# Patient Record
Sex: FEMALE | Race: WHITE | NOT HISPANIC OR LATINO | Employment: OTHER | URBAN - METROPOLITAN AREA
[De-identification: names, ages, dates, MRNs, and addresses within clinical notes are randomized per-mention and may not be internally consistent; named-entity substitution may affect disease eponyms.]

---

## 2018-06-11 ENCOUNTER — OFFICE VISIT (OUTPATIENT)
Dept: OPHTHALMOLOGY | Facility: CLINIC | Age: 83
End: 2018-06-11
Payer: MEDICARE

## 2018-06-11 DIAGNOSIS — R73.03 PRE-DIABETES: ICD-10-CM

## 2018-06-11 DIAGNOSIS — H52.4 BILATERAL PRESBYOPIA: ICD-10-CM

## 2018-06-11 DIAGNOSIS — Z96.1 PSEUDOPHAKIA OF BOTH EYES: Primary | ICD-10-CM

## 2018-06-11 DIAGNOSIS — I10 ESSENTIAL HYPERTENSION: ICD-10-CM

## 2018-06-11 PROCEDURE — 99999 PR PBB SHADOW E&M-EST. PATIENT-LVL II: CPT | Mod: PBBFAC,,, | Performed by: OPTOMETRIST

## 2018-06-11 PROCEDURE — 92015 DETERMINE REFRACTIVE STATE: CPT | Mod: S$GLB,,, | Performed by: OPTOMETRIST

## 2018-06-11 PROCEDURE — 92014 COMPRE OPH EXAM EST PT 1/>: CPT | Mod: S$GLB,,, | Performed by: OPTOMETRIST

## 2018-06-11 NOTE — PROGRESS NOTES
HPI     Blurred vision after reading for a long period of time.  Last eye exam 05/03/2016 TRF.   Update glasses RX.    Last edited by Luisa Das on 6/11/2018  2:26 PM. (History)            Assessment /Plan     For exam results, see Encounter Report.    Pseudophakia of both eyes    Essential hypertension    Pre-diabetes    Bilateral presbyopia      No HTN Retinopathy    Stable IOL OU.    No Background Diabetic Retinopathy    Dispense Final Rx for glasses.  RTC 1 year  Discussed above and answered questions.

## 2018-06-11 NOTE — PATIENT INSTRUCTIONS
Pseudophakia of both eyes     Essential hypertension     Pre-diabetes     Bilateral presbyopia        No HTN Retinopathy     Stable IOL OU.     No Background Diabetic Retinopathy     Dispense Final Rx for glasses.  RTC 1 year  Discussed above and answered questions.

## 2019-07-18 ENCOUNTER — OFFICE VISIT (OUTPATIENT)
Dept: OPHTHALMOLOGY | Facility: CLINIC | Age: 84
End: 2019-07-18
Payer: COMMERCIAL

## 2019-07-18 DIAGNOSIS — I10 ESSENTIAL HYPERTENSION: ICD-10-CM

## 2019-07-18 DIAGNOSIS — Z96.1 PSEUDOPHAKIA OF BOTH EYES: ICD-10-CM

## 2019-07-18 DIAGNOSIS — H52.4 BILATERAL PRESBYOPIA: ICD-10-CM

## 2019-07-18 DIAGNOSIS — R73.03 PRE-DIABETES: Primary | ICD-10-CM

## 2019-07-18 PROCEDURE — 99999 PR PBB SHADOW E&M-EST. PATIENT-LVL I: ICD-10-PCS | Mod: PBBFAC,,, | Performed by: OPTOMETRIST

## 2019-07-18 PROCEDURE — 92015 PR REFRACTION: ICD-10-PCS | Mod: S$GLB,,, | Performed by: OPTOMETRIST

## 2019-07-18 PROCEDURE — 92015 DETERMINE REFRACTIVE STATE: CPT | Mod: S$GLB,,, | Performed by: OPTOMETRIST

## 2019-07-18 PROCEDURE — 92014 PR EYE EXAM, EST PATIENT,COMPREHESV: ICD-10-PCS | Mod: S$GLB,,, | Performed by: OPTOMETRIST

## 2019-07-18 PROCEDURE — 92014 COMPRE OPH EXAM EST PT 1/>: CPT | Mod: S$GLB,,, | Performed by: OPTOMETRIST

## 2019-07-18 PROCEDURE — 99999 PR PBB SHADOW E&M-EST. PATIENT-LVL I: CPT | Mod: PBBFAC,,, | Performed by: OPTOMETRIST

## 2019-07-18 NOTE — PROGRESS NOTES
HPI     Hypertensive Eye Exam      Additional comments: Yearly              Comments     Last seen by TRF on 6/11/18 for yearly eye exam  Patient here today for yearly eye exam  No noticeable changes in vision since last eye exam  Wears Bifocal glasses full-time updated 3 years ago  No other complaints  No drops    1. PCIOL OU          Last edited by Jessica Jarvis, PCT on 7/18/2019  3:06 PM. (History)              Assessment /Plan     For exam results, see Encounter Report.    Pre-diabetes    Essential hypertension    Pseudophakia of both eyes    Bilateral presbyopia      No Background Diabetic Retinopathy    No HTN Retinopathy    Stable IOL OU.    Dispense Final Rx for glasses.  RTC 1 year  Discussed above and answered questions.

## 2020-07-27 ENCOUNTER — OFFICE VISIT (OUTPATIENT)
Dept: OPHTHALMOLOGY | Facility: CLINIC | Age: 85
End: 2020-07-27
Payer: COMMERCIAL

## 2020-07-27 DIAGNOSIS — Z96.1 PSEUDOPHAKIA OF BOTH EYES: ICD-10-CM

## 2020-07-27 DIAGNOSIS — I10 ESSENTIAL HYPERTENSION: ICD-10-CM

## 2020-07-27 DIAGNOSIS — R73.03 PRE-DIABETES: Primary | ICD-10-CM

## 2020-07-27 DIAGNOSIS — H52.4 BILATERAL PRESBYOPIA: ICD-10-CM

## 2020-07-27 PROCEDURE — 99999 PR PBB SHADOW E&M-EST. PATIENT-LVL III: ICD-10-PCS | Mod: PBBFAC,,, | Performed by: OPTOMETRIST

## 2020-07-27 PROCEDURE — 92014 PR EYE EXAM, EST PATIENT,COMPREHESV: ICD-10-PCS | Mod: S$GLB,,, | Performed by: OPTOMETRIST

## 2020-07-27 PROCEDURE — 92014 COMPRE OPH EXAM EST PT 1/>: CPT | Mod: S$GLB,,, | Performed by: OPTOMETRIST

## 2020-07-27 PROCEDURE — 92015 PR REFRACTION: ICD-10-PCS | Mod: S$GLB,,, | Performed by: OPTOMETRIST

## 2020-07-27 PROCEDURE — 99999 PR PBB SHADOW E&M-EST. PATIENT-LVL III: CPT | Mod: PBBFAC,,, | Performed by: OPTOMETRIST

## 2020-07-27 PROCEDURE — 92015 DETERMINE REFRACTIVE STATE: CPT | Mod: S$GLB,,, | Performed by: OPTOMETRIST

## 2020-07-27 RX ORDER — IBUPROFEN 100 MG/5ML
1000 SUSPENSION, ORAL (FINAL DOSE FORM) ORAL
COMMUNITY

## 2020-07-27 RX ORDER — ALBUTEROL SULFATE 90 UG/1
2 AEROSOL, METERED RESPIRATORY (INHALATION) EVERY 4 HOURS PRN
COMMUNITY
Start: 2020-07-13 | End: 2020-08-12

## 2020-07-27 RX ORDER — ACETAMINOPHEN 160 MG/5ML
200 SUSPENSION, ORAL (FINAL DOSE FORM) ORAL
COMMUNITY

## 2020-07-27 RX ORDER — METOPROLOL TARTRATE 25 MG/1
TABLET, FILM COATED ORAL
COMMUNITY
Start: 2017-01-30

## 2020-07-27 NOTE — PROGRESS NOTES
HPI     Eye Exam      Additional comments: yearly              Comments     Pre-diabetic  Last Exam w/ TRF 7/18/2019  Pt states that her eyes have been burning and her eyelids itch  OS feels like its straining to see especially up close.  No Pain          Last edited by Amita Spear on 7/27/2020  3:04 PM. (History)            Assessment /Plan     For exam results, see Encounter Report.    Pre-diabetes    Essential hypertension    Pseudophakia of both eyes    Bilateral presbyopia      No Background Diabetic Retinopathy    No HTN Retinopathy    Stable IOL OU.    Dispense Final Rx for glasses.  RTC 1 year  Discussed above and answered questions.

## 2021-05-12 ENCOUNTER — OFFICE VISIT (OUTPATIENT)
Dept: OPHTHALMOLOGY | Facility: CLINIC | Age: 86
End: 2021-05-12
Payer: MEDICARE

## 2021-05-12 DIAGNOSIS — M35.01 KERATITIS SICCA, BILATERAL: Primary | ICD-10-CM

## 2021-05-12 PROCEDURE — 99999 PR PBB SHADOW E&M-EST. PATIENT-LVL III: ICD-10-PCS | Mod: PBBFAC,,, | Performed by: OPTOMETRIST

## 2021-05-12 PROCEDURE — 92012 INTRM OPH EXAM EST PATIENT: CPT | Mod: S$GLB,,, | Performed by: OPTOMETRIST

## 2021-05-12 PROCEDURE — 92012 PR EYE EXAM, EST PATIENT,INTERMED: ICD-10-PCS | Mod: S$GLB,,, | Performed by: OPTOMETRIST

## 2021-05-12 PROCEDURE — 99999 PR PBB SHADOW E&M-EST. PATIENT-LVL III: CPT | Mod: PBBFAC,,, | Performed by: OPTOMETRIST

## 2021-05-12 RX ORDER — CYCLOSPORINE 0.5 MG/ML
1 EMULSION OPHTHALMIC 2 TIMES DAILY
Qty: 60 VIAL | Refills: 6 | Status: SHIPPED | OUTPATIENT
Start: 2021-05-12 | End: 2022-10-17

## 2021-05-14 ENCOUNTER — TELEPHONE (OUTPATIENT)
Dept: OPHTHALMOLOGY | Facility: CLINIC | Age: 86
End: 2021-05-14

## 2021-05-17 ENCOUNTER — TELEPHONE (OUTPATIENT)
Dept: OPHTHALMOLOGY | Facility: CLINIC | Age: 86
End: 2021-05-17

## 2021-05-17 DIAGNOSIS — M35.01 KERATITIS SICCA, BILATERAL: Primary | ICD-10-CM

## 2021-05-17 RX ORDER — PREDNISOLONE ACETATE 10 MG/ML
1 SUSPENSION/ DROPS OPHTHALMIC 2 TIMES DAILY
Qty: 5 ML | Refills: 0 | Status: SHIPPED | OUTPATIENT
Start: 2021-05-17 | End: 2021-05-27

## 2021-07-27 ENCOUNTER — OFFICE VISIT (OUTPATIENT)
Dept: OPHTHALMOLOGY | Facility: CLINIC | Age: 86
End: 2021-07-27
Payer: MEDICARE

## 2021-07-27 DIAGNOSIS — R73.03 PRE-DIABETES: ICD-10-CM

## 2021-07-27 DIAGNOSIS — H52.4 BILATERAL PRESBYOPIA: ICD-10-CM

## 2021-07-27 DIAGNOSIS — I10 ESSENTIAL HYPERTENSION: ICD-10-CM

## 2021-07-27 DIAGNOSIS — Z96.1 PSEUDOPHAKIA OF BOTH EYES: ICD-10-CM

## 2021-07-27 DIAGNOSIS — M35.01 KERATITIS SICCA, BILATERAL: Primary | ICD-10-CM

## 2021-07-27 PROCEDURE — 92015 PR REFRACTION: ICD-10-PCS | Mod: S$GLB,,, | Performed by: OPTOMETRIST

## 2021-07-27 PROCEDURE — 99999 PR PBB SHADOW E&M-EST. PATIENT-LVL III: CPT | Mod: PBBFAC,,, | Performed by: OPTOMETRIST

## 2021-07-27 PROCEDURE — 92015 DETERMINE REFRACTIVE STATE: CPT | Mod: S$GLB,,, | Performed by: OPTOMETRIST

## 2021-07-27 PROCEDURE — 92014 COMPRE OPH EXAM EST PT 1/>: CPT | Mod: S$GLB,,, | Performed by: OPTOMETRIST

## 2021-07-27 PROCEDURE — 1159F MED LIST DOCD IN RCRD: CPT | Mod: CPTII,S$GLB,, | Performed by: OPTOMETRIST

## 2021-07-27 PROCEDURE — 99999 PR PBB SHADOW E&M-EST. PATIENT-LVL III: ICD-10-PCS | Mod: PBBFAC,,, | Performed by: OPTOMETRIST

## 2021-07-27 PROCEDURE — 92014 PR EYE EXAM, EST PATIENT,COMPREHESV: ICD-10-PCS | Mod: S$GLB,,, | Performed by: OPTOMETRIST

## 2021-07-27 PROCEDURE — 1159F PR MEDICATION LIST DOCUMENTED IN MEDICAL RECORD: ICD-10-PCS | Mod: CPTII,S$GLB,, | Performed by: OPTOMETRIST

## 2022-10-14 ENCOUNTER — TELEPHONE (OUTPATIENT)
Dept: OPHTHALMOLOGY | Facility: CLINIC | Age: 87
End: 2022-10-14
Payer: MEDICARE

## 2022-10-14 NOTE — TELEPHONE ENCOUNTER
----- Message from Blake Chase MA sent at 10/14/2022  9:56 AM CDT -----  Contact: [phone]  Patient called            Patient would like for staff to give a call back in regards to getting patient scheduled for eye exam.          Call back  [phone]

## 2022-10-17 ENCOUNTER — OFFICE VISIT (OUTPATIENT)
Dept: OPHTHALMOLOGY | Facility: CLINIC | Age: 87
End: 2022-10-17
Payer: MEDICARE

## 2022-10-17 DIAGNOSIS — M35.01 KERATITIS SICCA, BILATERAL: ICD-10-CM

## 2022-10-17 DIAGNOSIS — I10 ESSENTIAL HYPERTENSION: ICD-10-CM

## 2022-10-17 DIAGNOSIS — R73.03 PRE-DIABETES: Primary | ICD-10-CM

## 2022-10-17 DIAGNOSIS — Z96.1 PSEUDOPHAKIA OF BOTH EYES: ICD-10-CM

## 2022-10-17 DIAGNOSIS — H52.4 BILATERAL PRESBYOPIA: ICD-10-CM

## 2022-10-17 PROCEDURE — 92014 PR EYE EXAM, EST PATIENT,COMPREHESV: ICD-10-PCS | Mod: S$GLB,,, | Performed by: OPTOMETRIST

## 2022-10-17 PROCEDURE — 92015 DETERMINE REFRACTIVE STATE: CPT | Mod: S$GLB,,, | Performed by: OPTOMETRIST

## 2022-10-17 PROCEDURE — 92015 PR REFRACTION: ICD-10-PCS | Mod: S$GLB,,, | Performed by: OPTOMETRIST

## 2022-10-17 PROCEDURE — 2023F PR DILATED RETINAL EXAM W/O EVID OF RETINOPATHY: ICD-10-PCS | Mod: CPTII,S$GLB,, | Performed by: OPTOMETRIST

## 2022-10-17 PROCEDURE — 2023F DILAT RTA XM W/O RTNOPTHY: CPT | Mod: CPTII,S$GLB,, | Performed by: OPTOMETRIST

## 2022-10-17 PROCEDURE — 1159F MED LIST DOCD IN RCRD: CPT | Mod: CPTII,S$GLB,, | Performed by: OPTOMETRIST

## 2022-10-17 PROCEDURE — 99999 PR PBB SHADOW E&M-EST. PATIENT-LVL III: CPT | Mod: PBBFAC,,, | Performed by: OPTOMETRIST

## 2022-10-17 PROCEDURE — 92014 COMPRE OPH EXAM EST PT 1/>: CPT | Mod: S$GLB,,, | Performed by: OPTOMETRIST

## 2022-10-17 PROCEDURE — 1159F PR MEDICATION LIST DOCUMENTED IN MEDICAL RECORD: ICD-10-PCS | Mod: CPTII,S$GLB,, | Performed by: OPTOMETRIST

## 2022-10-17 PROCEDURE — 99999 PR PBB SHADOW E&M-EST. PATIENT-LVL III: ICD-10-PCS | Mod: PBBFAC,,, | Performed by: OPTOMETRIST

## 2022-10-17 NOTE — PROGRESS NOTES
HPI    Annual exam.  Pt c/o problems reading for lengths of time.  She uses   tears, and vision returns to normal.    Pre-diabetic  Lab Results       Component                Value               Date                       HGBA1C                   5.8                 10/07/2013              Last edited by Constance Dixon MA on 10/17/2022  8:13 AM.            Assessment /Plan     For exam results, see Encounter Report.    Pre-diabetes    Keratitis sicca, bilateral    Essential hypertension    Pseudophakia of both eyes    Bilateral presbyopia    No Background Diabetic Retinopathy    Continue AT prn    No HTN Retinopathy    Stable IOL OU.    Dispense Final Rx for glasses or may use OTC glasses.  RTC 1 year  Discussed above and answered questions.

## 2023-06-07 ENCOUNTER — TELEPHONE (OUTPATIENT)
Dept: OPHTHALMOLOGY | Facility: CLINIC | Age: 88
End: 2023-06-07
Payer: MEDICARE

## 2023-06-07 NOTE — TELEPHONE ENCOUNTER
----- Message from Starr Jimenes sent at 6/7/2023  1:42 PM CDT -----  Contact: Annel Nayak is calling in regards to appt. Reports needing to schedule annual eye exam and office visit. Please return call at .[phone].

## 2023-06-18 DIAGNOSIS — N64.4 BREAST PAIN, LEFT: Primary | ICD-10-CM

## 2023-06-19 NOTE — PROGRESS NOTES
[facility]  MD Nitin Fox, NP-C    Chief Complaint:   Annel Arteaga is a 87 y.o. female presenting today for left breast pain that she first noticed 6 weeks ago.  She started drinking  a cook a day over the last 6 weeks. She is past due for  a Mammogram  She was last seen 2/23/2016.    History of Present Illness:   Mrs. Arteaga first presented on May 21, 2014 with left breast tenderness. Imaging results have been normal. In 2016 her CT chest showed a right breast nodule. Her mammogram and ultrasound remained normal. She did not follow up after  February 23, 2016 until she presents back June 25, 2023 with left breast pain. MD::: Néstor Hill MD.    Past Medical History:   Diagnosis Date    Allergy     Anesthesia     Arthritis     Asthma     Cataract      both eyes CPG    HEARING LOSS     High cholesterol     Hypertension     Joint pain     osteopenia    Mastodynia of left breast 6/25/2023    Obesity     Osteoporosis, unspecified     Pneumonia     did not require hospitalization      Past Surgical History:   Procedure Laterality Date    APPENDECTOMY      CATARACT EXTRACTION Bilateral     CPG    CATARACT EXTRACTION, BILATERAL  2006    HAND SURGERY      HYSTERECTOMY      STAPEDECTOMY      x 2    TONSILLECTOMY          Current Outpatient Medications:     amlodipine (NORVASC) 5 MG tablet, Take 5 mg by mouth once daily. , Disp: , Rfl: 0    ASCORBATE CALCIUM (KELL-C ORAL), Take by mouth.  , Disp: , Rfl:     ascorbic acid, vitamin C, (VITAMIN C) 1000 MG tablet, Take 1,000 mg by mouth., Disp: , Rfl:     aspirin (ECOTRIN) 81 MG EC tablet, Take 81 mg by mouth once daily.  , Disp: , Rfl:     b complex vitamins tablet, Take 1 tablet by mouth once daily., Disp: , Rfl:     biotin 10 mg Tab, Take 1 tablet by mouth once daily., Disp: , Rfl:     coenzyme Q10 200 mg capsule, Take 200 mg by mouth., Disp: , Rfl:     desonide (DESOWEN) 0.05 % cream, Apply topically 2 (two)  times daily. Mix 1:1 and use with ketoconazole cream BID prn for rash, use as needed for rash of face, Disp: 60 g, Rfl: 2    dextromethorphan-guaifenesin  mg (MUCINEX DM)  mg per 12 hr tablet, Take 1 tablet by mouth every 12 (twelve) hours as needed., Disp: , Rfl:     diphenhydrAMINE (SOMINEX) 25 mg tablet, Take 25 mg by mouth nightly as needed.  , Disp: , Rfl:     DOCUSATE CALCIUM (STOOL SOFTENER ORAL), Take by mouth once daily., Disp: , Rfl:     ERGOCALCIFEROL, VITAMIN D2, (VITAMIN D ORAL), Take by mouth once daily., Disp: , Rfl:     fluticasone (FLONASE) 50 mcg/actuation nasal spray, INSTILL 1 SPRAY IN EACH NOSTRIL TWICE DAILY, Disp: 1 Bottle, Rfl: 6    ketoconazole (NIZORAL) 2 % cream, AS DIRECTED TWICE DAILY AS NEEDED, Disp: 60 g, Rfl: 3    Lactobacillus acidophilus 10 billion cell Cap, Take 200 mg by mouth., Disp: , Rfl:     methylcellulose (ARTIFICIAL TEARS) 1 % ophthalmic solution, 1 drop as needed.  , Disp: , Rfl:     metoprolol tartrate (LOPRESSOR) 25 MG tablet, TK 1 T PO D, Disp: , Rfl:     montelukast (SINGULAIR) 10 mg tablet, Take 10 mg by mouth every evening., Disp: , Rfl:     multivitamin (THERAGRAN) per tablet, Take 1 tablet by mouth once daily.  , Disp: , Rfl:     omeprazole (PRILOSEC) 20 MG capsule, TAKE 2 CAPSULES BY MOUTH EVERY DAY, Disp: 180 capsule, Rfl: 0    TURMERIC ORAL, Take 400 mg by mouth., Disp: , Rfl:     vitamin E 400 UNIT capsule, Take 400 Units by mouth once daily.  , Disp: , Rfl:    Review of patient's allergies indicates:   Allergen Reactions    Onion Shortness Of Breath    Zoloft [sertraline] Hives, Itching and Rash    Codeine Other (See Comments)     Flushing skin    Esomeprazole magnesium Other (See Comments)     Leg pain    Nexium [esomeprazole magnesium] Other (See Comments)     Leg pain    Other Other (See Comments)     Pseudocholinesterase -- Paralysis    Pineapple Nausea And Vomiting    Prevacid [lansoprazole] Other (See Comments)     dysuria    Penicillins Rash     Vesicare [solifenacin] Hives and Rash      Social History     Tobacco Use    Smoking status: Never    Smokeless tobacco: Never   Substance Use Topics    Alcohol use: No      Family History   Problem Relation Age of Onset    Hypertension Mother     Glaucoma Father     Hypertension Father     Cancer Father         prostate     Hypertension Sister     Diabetes Sister     Heart disease Sister     Retinal detachment Sister     Glaucoma Brother     Hypertension Brother     Cancer Brother         Prostate    Hypertension Sister     Hypertension Sister     Hypertension Sister     Diabetes Daughter     Melanoma Neg Hx     Kidney disease Neg Hx     Stroke Neg Hx         Review of Systems   Integumentary:  Positive for breast tenderness. Negative for color change, rash, mole/lesion, breast mass and breast discharge.   Breast: Positive for tenderness.Negative for mass     Physical Exam   Constitutional: She appears well-developed. She is cooperative.   HENT:   Head: Normocephalic.   Cardiovascular:  Normal rate and regular rhythm.            Pulmonary/Chest: She exhibits no tenderness and no bony tenderness. Right breast exhibits no mass, no nipple discharge, no skin change and no tenderness. Left breast exhibits no mass, no nipple discharge, no skin change and no tenderness.   Abdominal: Soft. Normal appearance.   Musculoskeletal: Lymphadenopathy:      Upper Body:      Right upper body: No supraclavicular or axillary adenopathy.      Left upper body: No supraclavicular or axillary adenopathy.     Neurological: She is alert.   Skin: No rash noted.      Mammogram:  The breast tissue is heterogeneously dense, which lowers the sensitivity of mammography. Benign-type calcifications are identified, including vascular calcifications.  The mammographic appearance of both breasts has remained stable, and no definite mammographic signs of breast neoplasm are seen.    Ultrasound Left: normal with NEM    NOTE:::We viewed her films  together at today's visit.  We discussed the multiple views obtained and the important findings.  Even benign changes were mentioned and her questions were answered.  She knows that she may receive a formal letter or report from the Radiologist.  She is to contact us if she has questions.      Assessment/Plan  1. Mastodynia of left breast  Assessment & Plan:  We discussed our fibrocystic mastopathy protocol in detail. She knows that if she follows this protocol - that her symptoms should improve.  We discussed how breast pain is usually not associated with breast cancer, however, pain can be the presenting symptom with some cancers (but this could be coincidental). Still, if her pain does not improve in 8-12 weeks she should call us back for additional recommendations.               Medical Decision Making:  It is my impression that this patient suffers all conditions contained in this medical document.  Each of these conditions did affect our plan of care and my medical decision making today.  It is my opinion that the medical decision making concerning this patient was of moderate difficulty based on the aforementioned conditions.  Any further recommendations will be communicated to the patient by me.  I have reviewed and verified her allergies, list of medications, medical and surgical histories, social history, and a pertinent review of symptoms.      Follow up:  6 months and prn    For:  PE

## 2023-06-25 PROBLEM — N64.4 MASTODYNIA OF LEFT BREAST: Status: ACTIVE | Noted: 2023-06-25

## 2023-06-26 ENCOUNTER — OFFICE VISIT (OUTPATIENT)
Dept: SURGERY | Facility: CLINIC | Age: 88
End: 2023-06-26
Payer: MEDICARE

## 2023-06-26 DIAGNOSIS — N64.4 MASTODYNIA OF LEFT BREAST: ICD-10-CM

## 2023-06-26 PROCEDURE — 1159F MED LIST DOCD IN RCRD: CPT | Mod: CPTII,S$GLB,, | Performed by: NURSE PRACTITIONER

## 2023-06-26 PROCEDURE — 1126F AMNT PAIN NOTED NONE PRSNT: CPT | Mod: CPTII,S$GLB,, | Performed by: NURSE PRACTITIONER

## 2023-06-26 PROCEDURE — 1160F RVW MEDS BY RX/DR IN RCRD: CPT | Mod: CPTII,S$GLB,, | Performed by: NURSE PRACTITIONER

## 2023-06-26 PROCEDURE — 99213 PR OFFICE/OUTPT VISIT, EST, LEVL III, 20-29 MIN: ICD-10-PCS | Mod: S$GLB,,, | Performed by: NURSE PRACTITIONER

## 2023-06-26 PROCEDURE — 1159F PR MEDICATION LIST DOCUMENTED IN MEDICAL RECORD: ICD-10-PCS | Mod: CPTII,S$GLB,, | Performed by: NURSE PRACTITIONER

## 2023-06-26 PROCEDURE — 1126F PR PAIN SEVERITY QUANTIFIED, NO PAIN PRESENT: ICD-10-PCS | Mod: CPTII,S$GLB,, | Performed by: NURSE PRACTITIONER

## 2023-06-26 PROCEDURE — 99213 OFFICE O/P EST LOW 20 MIN: CPT | Mod: S$GLB,,, | Performed by: NURSE PRACTITIONER

## 2023-06-26 PROCEDURE — 1160F PR REVIEW ALL MEDS BY PRESCRIBER/CLIN PHARMACIST DOCUMENTED: ICD-10-PCS | Mod: CPTII,S$GLB,, | Performed by: NURSE PRACTITIONER

## 2023-10-18 ENCOUNTER — OFFICE VISIT (OUTPATIENT)
Dept: OPHTHALMOLOGY | Facility: CLINIC | Age: 88
End: 2023-10-18
Payer: MEDICARE

## 2023-10-18 DIAGNOSIS — R73.03 PRE-DIABETES: Primary | ICD-10-CM

## 2023-10-18 DIAGNOSIS — H52.4 BILATERAL PRESBYOPIA: ICD-10-CM

## 2023-10-18 DIAGNOSIS — Z96.1 PSEUDOPHAKIA OF BOTH EYES: ICD-10-CM

## 2023-10-18 DIAGNOSIS — I10 ESSENTIAL HYPERTENSION: ICD-10-CM

## 2023-10-18 PROCEDURE — 2023F PR DILATED RETINAL EXAM W/O EVID OF RETINOPATHY: ICD-10-PCS | Mod: CPTII,S$GLB,, | Performed by: OPTOMETRIST

## 2023-10-18 PROCEDURE — 92014 PR EYE EXAM, EST PATIENT,COMPREHESV: ICD-10-PCS | Mod: S$GLB,,, | Performed by: OPTOMETRIST

## 2023-10-18 PROCEDURE — 92015 DETERMINE REFRACTIVE STATE: CPT | Mod: S$GLB,,, | Performed by: OPTOMETRIST

## 2023-10-18 PROCEDURE — 1159F MED LIST DOCD IN RCRD: CPT | Mod: CPTII,S$GLB,, | Performed by: OPTOMETRIST

## 2023-10-18 PROCEDURE — 2023F DILAT RTA XM W/O RTNOPTHY: CPT | Mod: CPTII,S$GLB,, | Performed by: OPTOMETRIST

## 2023-10-18 PROCEDURE — 92015 PR REFRACTION: ICD-10-PCS | Mod: S$GLB,,, | Performed by: OPTOMETRIST

## 2023-10-18 PROCEDURE — 99999 PR PBB SHADOW E&M-EST. PATIENT-LVL III: CPT | Mod: PBBFAC,,, | Performed by: OPTOMETRIST

## 2023-10-18 PROCEDURE — 1159F PR MEDICATION LIST DOCUMENTED IN MEDICAL RECORD: ICD-10-PCS | Mod: CPTII,S$GLB,, | Performed by: OPTOMETRIST

## 2023-10-18 PROCEDURE — 99999 PR PBB SHADOW E&M-EST. PATIENT-LVL III: ICD-10-PCS | Mod: PBBFAC,,, | Performed by: OPTOMETRIST

## 2023-10-18 PROCEDURE — 92014 COMPRE OPH EXAM EST PT 1/>: CPT | Mod: S$GLB,,, | Performed by: OPTOMETRIST

## 2023-10-18 NOTE — PROGRESS NOTES
HPI    Blurred vision when not using the artifical tears  Last eye exam 10/17/2022 TRF.  Update glasses RX.  Lab Results       Component                Value               Date                       HGBA1C                   5.6                 07/05/2023              Last edited by Luisa Das MA on 10/18/2023  9:47 AM.            Assessment /Plan     For exam results, see Encounter Report.    Pre-diabetes    Essential hypertension    Pseudophakia of both eyes    Bilateral presbyopia      No Background Diabetic Retinopathy  Strict BG control, f/u w/ PCP, and annual DFE  Stressed importance of DM control to preserve vision    No HTN Retinopathy    Stable IOL OU.    Dispense Final Rx for glasses.  RTC 1 year  Discussed above and answered questions.

## 2024-01-25 ENCOUNTER — HOSPITAL ENCOUNTER (EMERGENCY)
Facility: HOSPITAL | Age: 89
Discharge: HOME | End: 2024-01-26
Attending: EMERGENCY MEDICINE
Payer: MEDICARE

## 2024-01-25 DIAGNOSIS — R07.9 CHEST PAIN: ICD-10-CM

## 2024-01-25 DIAGNOSIS — R00.2 PALPITATIONS: Primary | ICD-10-CM

## 2024-01-25 LAB
ALBUMIN SERPL BCP-MCNC: 4.4 G/DL (ref 3.5–5.2)
ALP SERPL-CCNC: 78 U/L (ref 55–135)
ALT SERPL W/O P-5'-P-CCNC: 15 U/L (ref 10–44)
ANION GAP SERPL CALC-SCNC: 13 MMOL/L (ref 8–16)
AST SERPL-CCNC: 18 U/L (ref 10–40)
BASOPHILS # BLD AUTO: 0.06 K/UL (ref 0–0.2)
BASOPHILS NFR BLD: 0.7 % (ref 0–1.9)
BILIRUB SERPL-MCNC: 0.3 MG/DL (ref 0.1–1)
BNP SERPL-MCNC: 40 PG/ML (ref 0–99)
BUN SERPL-MCNC: 17 MG/DL (ref 8–23)
CALCIUM SERPL-MCNC: 9.4 MG/DL (ref 8.7–10.5)
CHLORIDE SERPL-SCNC: 107 MMOL/L (ref 95–110)
CO2 SERPL-SCNC: 21 MMOL/L (ref 23–29)
CREAT SERPL-MCNC: 0.8 MG/DL (ref 0.5–1.4)
DIFFERENTIAL METHOD BLD: NORMAL
EOSINOPHIL # BLD AUTO: 0.2 K/UL (ref 0–0.5)
EOSINOPHIL NFR BLD: 2.4 % (ref 0–8)
ERYTHROCYTE [DISTWIDTH] IN BLOOD BY AUTOMATED COUNT: 13.3 % (ref 11.5–14.5)
EST. GFR  (NO RACE VARIABLE): >60 ML/MIN/1.73 M^2
GLUCOSE SERPL-MCNC: 98 MG/DL (ref 70–110)
HCT VFR BLD AUTO: 39.4 % (ref 37–48.5)
HGB BLD-MCNC: 12.9 G/DL (ref 12–16)
IMM GRANULOCYTES # BLD AUTO: 0.02 K/UL (ref 0–0.04)
IMM GRANULOCYTES NFR BLD AUTO: 0.2 % (ref 0–0.5)
LYMPHOCYTES # BLD AUTO: 3.4 K/UL (ref 1–4.8)
LYMPHOCYTES NFR BLD: 38.8 % (ref 18–48)
MAGNESIUM SERPL-MCNC: 1.8 MG/DL (ref 1.6–2.6)
MCH RBC QN AUTO: 29.6 PG (ref 27–31)
MCHC RBC AUTO-ENTMCNC: 32.7 G/DL (ref 32–36)
MCV RBC AUTO: 90 FL (ref 82–98)
MONOCYTES # BLD AUTO: 0.8 K/UL (ref 0.3–1)
MONOCYTES NFR BLD: 9.4 % (ref 4–15)
NEUTROPHILS # BLD AUTO: 4.3 K/UL (ref 1.8–7.7)
NEUTROPHILS NFR BLD: 48.5 % (ref 38–73)
NRBC BLD-RTO: 0 /100 WBC
PLATELET # BLD AUTO: 253 K/UL (ref 150–450)
PMV BLD AUTO: 10.8 FL (ref 9.2–12.9)
POTASSIUM SERPL-SCNC: 3.9 MMOL/L (ref 3.5–5.1)
PROT SERPL-MCNC: 8.4 G/DL (ref 6–8.4)
RBC # BLD AUTO: 4.36 M/UL (ref 4–5.4)
SODIUM SERPL-SCNC: 141 MMOL/L (ref 136–145)
TROPONIN I SERPL DL<=0.01 NG/ML-MCNC: <0.006 NG/ML (ref 0–0.03)
WBC # BLD AUTO: 8.84 K/UL (ref 3.9–12.7)

## 2024-01-25 PROCEDURE — 83880 ASSAY OF NATRIURETIC PEPTIDE: CPT | Performed by: NURSE PRACTITIONER

## 2024-01-25 PROCEDURE — 83735 ASSAY OF MAGNESIUM: CPT | Performed by: NURSE PRACTITIONER

## 2024-01-25 PROCEDURE — 36415 COLL VENOUS BLD VENIPUNCTURE: CPT | Performed by: NURSE PRACTITIONER

## 2024-01-25 PROCEDURE — 85025 COMPLETE CBC W/AUTO DIFF WBC: CPT | Performed by: NURSE PRACTITIONER

## 2024-01-25 PROCEDURE — 84443 ASSAY THYROID STIM HORMONE: CPT | Performed by: NURSE PRACTITIONER

## 2024-01-25 PROCEDURE — 93010 ELECTROCARDIOGRAM REPORT: CPT | Mod: ,,, | Performed by: INTERNAL MEDICINE

## 2024-01-25 PROCEDURE — 80053 COMPREHEN METABOLIC PANEL: CPT | Performed by: EMERGENCY MEDICINE

## 2024-01-25 PROCEDURE — 93005 ELECTROCARDIOGRAM TRACING: CPT

## 2024-01-25 PROCEDURE — 84484 ASSAY OF TROPONIN QUANT: CPT | Performed by: NURSE PRACTITIONER

## 2024-01-25 PROCEDURE — 99285 EMERGENCY DEPT VISIT HI MDM: CPT | Mod: 25

## 2024-01-25 PROCEDURE — 25000003 PHARM REV CODE 250: Performed by: EMERGENCY MEDICINE

## 2024-01-25 RX ORDER — AMLODIPINE BESYLATE 5 MG/1
5 TABLET ORAL
Status: COMPLETED | OUTPATIENT
Start: 2024-01-25 | End: 2024-01-25

## 2024-01-25 RX ORDER — ASPIRIN 325 MG
325 TABLET ORAL
Status: COMPLETED | OUTPATIENT
Start: 2024-01-25 | End: 2024-01-25

## 2024-01-25 RX ADMIN — AMLODIPINE BESYLATE 5 MG: 5 TABLET ORAL at 11:01

## 2024-01-25 RX ADMIN — ASPIRIN 325 MG ORAL TABLET 325 MG: 325 PILL ORAL at 11:01

## 2024-01-26 VITALS
SYSTOLIC BLOOD PRESSURE: 141 MMHG | TEMPERATURE: 98 F | DIASTOLIC BLOOD PRESSURE: 65 MMHG | HEART RATE: 75 BPM | OXYGEN SATURATION: 97 % | RESPIRATION RATE: 17 BRPM

## 2024-01-26 LAB
BILIRUB UR QL STRIP: NEGATIVE
CLARITY UR: CLEAR
COLOR UR: COLORLESS
GLUCOSE UR QL STRIP: NEGATIVE
HGB UR QL STRIP: NEGATIVE
KETONES UR QL STRIP: NEGATIVE
LEUKOCYTE ESTERASE UR QL STRIP: NEGATIVE
NITRITE UR QL STRIP: NEGATIVE
PH UR STRIP: 5 [PH] (ref 5–8)
PROT UR QL STRIP: NEGATIVE
SP GR UR STRIP: 1.01 (ref 1–1.03)
TROPONIN I SERPL DL<=0.01 NG/ML-MCNC: <0.006 NG/ML (ref 0–0.03)
TSH SERPL DL<=0.005 MIU/L-ACNC: 3.45 UIU/ML (ref 0.4–4)
URN SPEC COLLECT METH UR: ABNORMAL
UROBILINOGEN UR STRIP-ACNC: NEGATIVE EU/DL

## 2024-01-26 PROCEDURE — 84484 ASSAY OF TROPONIN QUANT: CPT | Performed by: EMERGENCY MEDICINE

## 2024-01-26 PROCEDURE — 81003 URINALYSIS AUTO W/O SCOPE: CPT | Performed by: EMERGENCY MEDICINE

## 2024-01-26 NOTE — FIRST PROVIDER EVALUATION
Medical screening examination initiated.  I have conducted a focused provider triage encounter, findings are as follows:    Brief history of present illness:  Patient presents with left-sided chest pain and states that she is going in and out of AFib.  History of AFib, not on blood thinners.    Vitals:    01/25/24 1849   BP: (!) 183/89   Pulse: 78   Resp: 18   Temp: 98 °F (36.7 °C)   TempSrc: Oral   SpO2: 97%       Pertinent physical exam:  No acute distress noted.    Brief workup plan:  Cardiac workup    Preliminary workup initiated; this workup will be continued and followed by the physician or advanced practice provider that is assigned to the patient when roomed.

## 2024-01-26 NOTE — ED PROVIDER NOTES
SCRIBE #1 NOTE: I, Zeny Bell, am scribing for, and in the presence of, Kaelyn Canas DO. I have scribed the entire note.       History     Chief Complaint   Patient presents with    Chest Pain     Pt c/o midsternal chest pain and pain behind left breast. Hx of afib and states that she is feeling dizzy.      Review of patient's allergies indicates:   Allergen Reactions    Onion Shortness Of Breath    Zoloft [sertraline] Hives, Itching and Rash    Codeine Other (See Comments)     Flushing skin    Other Other (See Comments)     Pseudocholinesterase -- Paralysis    Pineapple Nausea And Vomiting    Prevacid [lansoprazole] Other (See Comments)     dysuria    Penicillins Rash    Vesicare [solifenacin] Hives and Rash         History of Present Illness     HPI    1/25/2024, 11:08 PM  History obtained from the patient      History of Present Illness: Annel Arteaga is a 88 y.o. female patient with a PMHx of HTN, afib, high cholesterol, osteoporosis, mastodynia of left breast, and obesity who presents to the Emergency Department for evaluation of CP. Pt reports her CP as a moderate pain and pressure in the center of her chest that radiated to under her left breast and subsided on its own. Pt reports she was afib and got dizzy around 1800, but states the dizziness has since subsided. Symptoms are constant and moderate in severity. No mitigating or exacerbating factors reported. Associated sxs include palpitations. Patient denies any SOB, N/V, diaphoresis, and all other sxs at this time. No prior Tx reported. Pt reports her last stress test was a year or two ago. She also notes her cardiologist Dr. Lovell said she has no other heart problems other than afib. No further complaints or concerns at this time.       Arrival mode: Personal vehicle     PCP: Liliya Cuadra MD        Past Medical History:  Past Medical History:   Diagnosis Date    Allergy     Anesthesia     Arthritis     Asthma     Cataract       both eyes CPG    HEARING LOSS     High cholesterol     Hypertension     Joint pain     osteopenia    Mastodynia of left breast 6/25/2023    Obesity     Osteoporosis, unspecified     Pneumonia     did not require hospitalization       Past Surgical History:  Past Surgical History:   Procedure Laterality Date    APPENDECTOMY      CATARACT EXTRACTION Bilateral     CPG    CATARACT EXTRACTION, BILATERAL  2006    HAND SURGERY      HYSTERECTOMY      STAPEDECTOMY      x 2    TONSILLECTOMY           Family History:  Family History   Problem Relation Age of Onset    Hypertension Mother     Glaucoma Father     Hypertension Father     Cancer Father         prostate     Hypertension Sister     Diabetes Sister     Heart disease Sister     Retinal detachment Sister     Glaucoma Brother     Hypertension Brother     Cancer Brother         Prostate    Hypertension Sister     Hypertension Sister     Hypertension Sister     Diabetes Daughter     Melanoma Neg Hx     Kidney disease Neg Hx     Stroke Neg Hx        Social History:  Social History     Tobacco Use    Smoking status: Never    Smokeless tobacco: Never   Substance and Sexual Activity    Alcohol use: No    Drug use: No    Sexual activity: Never     Birth control/protection: None        Review of Systems     Review of Systems   Constitutional:  Negative for diaphoresis.   Respiratory:  Negative for shortness of breath.    Cardiovascular:  Positive for chest pain and palpitations.        Chest pressure. Pain behind left breast.    Gastrointestinal:  Negative for nausea and vomiting.   Neurological:  Positive for dizziness.        Physical Exam     Initial Vitals [01/25/24 1849]   BP Pulse Resp Temp SpO2   (!) 183/89 78 18 98 °F (36.7 °C) 97 %      MAP       --          Physical Exam  Nursing Notes and Vital Signs Reviewed.  Constitutional: Patient is in no acute distress. Well-developed and well-nourished.  Head: Atraumatic.  Normocephalic.  Eyes: PERRL. EOM intact. Conjunctivae are not pale. No scleral icterus.  ENT: Mucous membranes are moist. Oropharynx is clear and symmetric.    Neck: Supple. Full ROM. No lymphadenopathy.  Cardiovascular: Regular rate. Regular rhythm. No murmurs, rubs, or gallops. Distal pulses are 2+ and symmetric.  Pulmonary/Chest: No respiratory distress. Clear to auscultation bilaterally. No wheezing or rales.  Abdominal: Soft and non-distended.  There is no tenderness.  No rebound, guarding, or rigidity.   Genitourinary: No CVA tenderness  Musculoskeletal: Moves all extremities. No obvious deformities. No edema.   Skin: Warm and dry.  Neurological:  Alert, awake, and appropriate.  Normal speech.  No acute focal neurological deficits are appreciated.  Psychiatric: Normal affect. Good eye contact. Appropriate in content.     ED Course   Procedures  ED Vital Signs:  Vitals:    01/25/24 1849 01/25/24 2250 01/25/24 2251 01/25/24 2303   BP: (!) 183/89 (!) 204/84  (!) 176/81   Pulse: 78 74 70 77   Resp: 18   17   Temp: 98 °F (36.7 °C)      TempSrc: Oral      SpO2: 97% 97%  99%    01/25/24 2332 01/26/24 0021 01/26/24 0032 01/26/24 0103   BP: (!) 167/71 (!) 170/74 (!) 154/59 (!) 141/65   Pulse: 72 74 73 75   Resp:  18 16 17   Temp:       TempSrc:       SpO2: 97% 97% 96% 97%       Abnormal Lab Results:  Labs Reviewed   COMPREHENSIVE METABOLIC PANEL - Abnormal; Notable for the following components:       Result Value    CO2 21 (*)     All other components within normal limits   URINALYSIS, REFLEX TO URINE CULTURE - Abnormal; Notable for the following components:    Color, UA Colorless (*)     All other components within normal limits    Narrative:     Specimen Source->Urine   CBC W/ AUTO DIFFERENTIAL   TROPONIN I   B-TYPE NATRIURETIC PEPTIDE   MAGNESIUM   TSH   MAGNESIUM   TSH   TROPONIN I        All Lab Results:  Results for orders placed or performed during the hospital encounter of 01/25/24   CBC auto differential    Result Value Ref Range    WBC 8.84 3.90 - 12.70 K/uL    RBC 4.36 4.00 - 5.40 M/uL    Hemoglobin 12.9 12.0 - 16.0 g/dL    Hematocrit 39.4 37.0 - 48.5 %    MCV 90 82 - 98 fL    MCH 29.6 27.0 - 31.0 pg    MCHC 32.7 32.0 - 36.0 g/dL    RDW 13.3 11.5 - 14.5 %    Platelets 253 150 - 450 K/uL    MPV 10.8 9.2 - 12.9 fL    Immature Granulocytes 0.2 0.0 - 0.5 %    Gran # (ANC) 4.3 1.8 - 7.7 K/uL    Immature Grans (Abs) 0.02 0.00 - 0.04 K/uL    Lymph # 3.4 1.0 - 4.8 K/uL    Mono # 0.8 0.3 - 1.0 K/uL    Eos # 0.2 0.0 - 0.5 K/uL    Baso # 0.06 0.00 - 0.20 K/uL    nRBC 0 0 /100 WBC    Gran % 48.5 38.0 - 73.0 %    Lymph % 38.8 18.0 - 48.0 %    Mono % 9.4 4.0 - 15.0 %    Eosinophil % 2.4 0.0 - 8.0 %    Basophil % 0.7 0.0 - 1.9 %    Differential Method Automated    Troponin I #2   Result Value Ref Range    Troponin I <0.006 0.000 - 0.026 ng/mL   BNP   Result Value Ref Range    BNP 40 0 - 99 pg/mL   Comprehensive metabolic panel   Result Value Ref Range    Sodium 141 136 - 145 mmol/L    Potassium 3.9 3.5 - 5.1 mmol/L    Chloride 107 95 - 110 mmol/L    CO2 21 (L) 23 - 29 mmol/L    Glucose 98 70 - 110 mg/dL    BUN 17 8 - 23 mg/dL    Creatinine 0.8 0.5 - 1.4 mg/dL    Calcium 9.4 8.7 - 10.5 mg/dL    Total Protein 8.4 6.0 - 8.4 g/dL    Albumin 4.4 3.5 - 5.2 g/dL    Total Bilirubin 0.3 0.1 - 1.0 mg/dL    Alkaline Phosphatase 78 55 - 135 U/L    AST 18 10 - 40 U/L    ALT 15 10 - 44 U/L    eGFR >60 >60 mL/min/1.73 m^2    Anion Gap 13 8 - 16 mmol/L   Magnesium   Result Value Ref Range    Magnesium 1.8 1.6 - 2.6 mg/dL   TSH   Result Value Ref Range    TSH 3.448 0.400 - 4.000 uIU/mL   Troponin I   Result Value Ref Range    Troponin I <0.006 0.000 - 0.026 ng/mL   Urinalysis, Reflex to Urine Culture Urine, Clean Catch    Specimen: Urine   Result Value Ref Range    Specimen UA Urine, Clean Catch     Color, UA Colorless (A) Yellow, Straw, Sirena    Appearance, UA Clear Clear    pH, UA 5.0 5.0 - 8.0    Specific Gravity, UA 1.010 1.005 - 1.030     Protein, UA Negative Negative    Glucose, UA Negative Negative    Ketones, UA Negative Negative    Bilirubin (UA) Negative Negative    Occult Blood UA Negative Negative    Nitrite, UA Negative Negative    Urobilinogen, UA Negative <2.0 EU/dL    Leukocytes, UA Negative Negative        Imaging Results:  Imaging Results              X-Ray Chest AP Portable (Final result)  Result time 01/25/24 20:07:41      Final result by Faye Newell MD (01/25/24 20:07:41)                   Impression:      No active pulmonary finding      Electronically signed by: Faye Newell  Date:    01/25/2024  Time:    20:07               Narrative:    EXAMINATION:  XR CHEST AP PORTABLE    CLINICAL HISTORY:  Chest Pain;    TECHNIQUE:  Single frontal portable view of the chest was performed.    COMPARISON:  February 2013    FINDINGS:  No pulmonary consolidation or pleural effusion.  No shift of the mediastinum or convincing pneumothorax                                       The EKG was ordered, reviewed, and independently interpreted by the ED provider.  Interpretation time: 18:55  Rate: 73 BPM  Rhythm: normal sinus rhythm  Interpretation: Left axis deviation. Septal infarct, age undetermined. No STEMI.           The Emergency Provider reviewed the vital signs and test results, which are outlined above.     ED Discussion   1:19 AM: Reassessed pt at this time.Discussed with pt all pertinent ED information and results. Discussed pt dx and plan of tx. Gave pt all f/u and return to the ED instructions. All questions and concerns were addressed at this time. Pt expresses understanding of information and instructions, and is comfortable with plan to discharge. Pt is stable for discharge.    I discussed with patient and/or family/caretaker that evaluation in the ED does not suggest any emergent or life threatening medical conditions requiring immediate intervention beyond what was provided in the ED, and I believe patient is safe for  discharge.  Regardless, an unremarkable evaluation in the ED does not preclude the development or presence of a serious of life threatening condition. As such, patient was instructed to return immediately for any worsening or change in current symptoms.       ED Course as of 01/26/24 0142   Thu Jan 25, 2024   2249 TTE 2022  1. Normal left ventricular cavity size. Normal left ventricular systolic function. LVEF   55 - 65%. Normal wall motion.   2. Normal right ventricular size. Normal right ventricular systolic function.   3. No pulmonary hypertension.   4. AV sclerosis without stenosis.    [NF]   Fri Jan 26, 2024   0140 Differential diagnosis includes but is not limited to ACS, electrolyte derangement, paroxysmal AFib, PVCs, PACs, hyperthyroidism, thyrotoxicosis, thyroid storm, CHF, infection, anemia.  CBC, CMP, magnesium, BNP, TSH, UA all normal.  EKG shows no acute ischemic changes and troponin x2 negative.  Instructed to follow up with Dr. Lovell (cardiology) tomorrow or early next week.  Reports a normal stress test 1 or 2 years ago. [NF]      ED Course User Index  [NF] Kaelyn Canas, DO     Medical Decision Making  Amount and/or Complexity of Data Reviewed  Labs: ordered. Decision-making details documented in ED Course.  Radiology: ordered. Decision-making details documented in ED Course.  ECG/medicine tests: ordered. Decision-making details documented in ED Course.    Risk  OTC drugs.  Prescription drug management.                ED Medication(s):  Medications   aspirin tablet 325 mg (325 mg Oral Given 1/25/24 2315)   amLODIPine tablet 5 mg (5 mg Oral Given 1/25/24 2349)       New Prescriptions    No medications on file        Follow-up Information       [facility] - Emergency Dept..    Specialty: Emergency Medicine  Why: As needed, If symptoms worsen  Contact information:  [address] [facility] [address] [phone]             Liliya Cuadra MD In 1 week.    Specialty:  Pediatrics  Contact information:  [address]  [facility] [address]  [phone]               Tucker Lovell MD On 1/29/2024.    Specialty: Cardiology  Contact information:  [address]  [address]  [facility] [place] [place] [address]  [phone]                                 Scribe Attestation:   Scribe #1: I performed the above scribed service and the documentation accurately describes the services I performed. I attest to the accuracy of the note.     Attending:   Physician Attestation Statement for Scribe #1: I, Kaelyn Canas DO, personally performed the services described in this documentation, as scribed by Zeny Bell, in my presence, and it is both accurate and complete.           Clinical Impression       ICD-10-CM ICD-9-CM   1. Palpitations  R00.2 785.1   2. Chest pain  R07.9 786.50       Disposition:   Disposition: Discharged  Condition: Stable        Kaelyn Canas DO  01/26/24 0142

## 2024-08-25 ENCOUNTER — HOSPITAL ENCOUNTER (INPATIENT)
Facility: HOSPITAL | Age: 89
LOS: 1 days | Discharge: HOME | DRG: 309 | End: 2024-08-26
Attending: EMERGENCY MEDICINE | Admitting: HOSPITALIST
Payer: MEDICARE

## 2024-08-25 DIAGNOSIS — R07.89 SENSATION OF CHEST PRESSURE: ICD-10-CM

## 2024-08-25 DIAGNOSIS — I48.91 ATRIAL FIBRILLATION WITH RVR: Primary | ICD-10-CM

## 2024-08-25 DIAGNOSIS — I49.3 PVC'S (PREMATURE VENTRICULAR CONTRACTIONS): ICD-10-CM

## 2024-08-25 DIAGNOSIS — R07.9 CHEST PAIN: ICD-10-CM

## 2024-08-25 DIAGNOSIS — I21.4 NSTEMI (NON-ST ELEVATED MYOCARDIAL INFARCTION): ICD-10-CM

## 2024-08-25 DIAGNOSIS — I48.91 ATRIAL FIBRILLATION WITH RAPID VENTRICULAR RESPONSE: ICD-10-CM

## 2024-08-25 LAB
ALBUMIN SERPL BCP-MCNC: 3.7 G/DL (ref 3.5–5.2)
ALP SERPL-CCNC: 75 U/L (ref 55–135)
ALT SERPL W/O P-5'-P-CCNC: 14 U/L (ref 10–44)
ANION GAP SERPL CALC-SCNC: 15 MMOL/L (ref 8–16)
AST SERPL-CCNC: 21 U/L (ref 10–40)
BASOPHILS # BLD AUTO: 0.04 K/UL (ref 0–0.2)
BASOPHILS NFR BLD: 0.4 % (ref 0–1.9)
BILIRUB SERPL-MCNC: 0.3 MG/DL (ref 0.1–1)
BNP SERPL-MCNC: 87 PG/ML (ref 0–99)
BUN SERPL-MCNC: 26 MG/DL (ref 8–23)
CALCIUM SERPL-MCNC: 9.1 MG/DL (ref 8.7–10.5)
CHLORIDE SERPL-SCNC: 109 MMOL/L (ref 95–110)
CO2 SERPL-SCNC: 17 MMOL/L (ref 23–29)
CREAT SERPL-MCNC: 1 MG/DL (ref 0.5–1.4)
DIFFERENTIAL METHOD BLD: NORMAL
EOSINOPHIL # BLD AUTO: 0.2 K/UL (ref 0–0.5)
EOSINOPHIL NFR BLD: 2.2 % (ref 0–8)
ERYTHROCYTE [DISTWIDTH] IN BLOOD BY AUTOMATED COUNT: 13.6 % (ref 11.5–14.5)
EST. GFR  (NO RACE VARIABLE): 54 ML/MIN/1.73 M^2
GLUCOSE SERPL-MCNC: 162 MG/DL (ref 70–110)
HCT VFR BLD AUTO: 37.2 % (ref 37–48.5)
HGB BLD-MCNC: 12.4 G/DL (ref 12–16)
IMM GRANULOCYTES # BLD AUTO: 0.02 K/UL (ref 0–0.04)
IMM GRANULOCYTES NFR BLD AUTO: 0.2 % (ref 0–0.5)
LYMPHOCYTES # BLD AUTO: 3.1 K/UL (ref 1–4.8)
LYMPHOCYTES NFR BLD: 33.3 % (ref 18–48)
MCH RBC QN AUTO: 30.2 PG (ref 27–31)
MCHC RBC AUTO-ENTMCNC: 33.3 G/DL (ref 32–36)
MCV RBC AUTO: 91 FL (ref 82–98)
MONOCYTES # BLD AUTO: 0.8 K/UL (ref 0.3–1)
MONOCYTES NFR BLD: 8.3 % (ref 4–15)
NEUTROPHILS # BLD AUTO: 5.2 K/UL (ref 1.8–7.7)
NEUTROPHILS NFR BLD: 55.6 % (ref 38–73)
NRBC BLD-RTO: 0 /100 WBC
PLATELET # BLD AUTO: 252 K/UL (ref 150–450)
PMV BLD AUTO: 10.9 FL (ref 9.2–12.9)
POTASSIUM SERPL-SCNC: 3.6 MMOL/L (ref 3.5–5.1)
PROT SERPL-MCNC: 7.3 G/DL (ref 6–8.4)
RBC # BLD AUTO: 4.11 M/UL (ref 4–5.4)
SODIUM SERPL-SCNC: 141 MMOL/L (ref 136–145)
T4 FREE SERPL-MCNC: 0.93 NG/DL (ref 0.71–1.51)
TROPONIN I SERPL DL<=0.01 NG/ML-MCNC: <0.006 NG/ML (ref 0–0.03)
TSH SERPL DL<=0.005 MIU/L-ACNC: 4.49 UIU/ML (ref 0.4–4)
WBC # BLD AUTO: 9.36 K/UL (ref 3.9–12.7)

## 2024-08-25 PROCEDURE — G0378 HOSPITAL OBSERVATION PER HR: HCPCS

## 2024-08-25 PROCEDURE — 25000003 PHARM REV CODE 250: Performed by: EMERGENCY MEDICINE

## 2024-08-25 PROCEDURE — 84484 ASSAY OF TROPONIN QUANT: CPT | Performed by: EMERGENCY MEDICINE

## 2024-08-25 PROCEDURE — 80053 COMPREHEN METABOLIC PANEL: CPT | Performed by: EMERGENCY MEDICINE

## 2024-08-25 PROCEDURE — 96374 THER/PROPH/DIAG INJ IV PUSH: CPT

## 2024-08-25 PROCEDURE — 85025 COMPLETE CBC W/AUTO DIFF WBC: CPT | Performed by: EMERGENCY MEDICINE

## 2024-08-25 PROCEDURE — 93010 ELECTROCARDIOGRAM REPORT: CPT | Mod: ,,, | Performed by: INTERNAL MEDICINE

## 2024-08-25 PROCEDURE — 84443 ASSAY THYROID STIM HORMONE: CPT | Performed by: EMERGENCY MEDICINE

## 2024-08-25 PROCEDURE — 83880 ASSAY OF NATRIURETIC PEPTIDE: CPT | Performed by: EMERGENCY MEDICINE

## 2024-08-25 PROCEDURE — 93005 ELECTROCARDIOGRAM TRACING: CPT

## 2024-08-25 PROCEDURE — 84439 ASSAY OF FREE THYROXINE: CPT | Performed by: EMERGENCY MEDICINE

## 2024-08-25 PROCEDURE — 99285 EMERGENCY DEPT VISIT HI MDM: CPT | Mod: 25

## 2024-08-25 RX ORDER — DILTIAZEM HYDROCHLORIDE 5 MG/ML
20 INJECTION INTRAVENOUS
Status: COMPLETED | OUTPATIENT
Start: 2024-08-25 | End: 2024-08-25

## 2024-08-25 RX ADMIN — DILTIAZEM HYDROCHLORIDE 20 MG: 5 INJECTION INTRAVENOUS at 11:08

## 2024-08-25 NOTE — Clinical Note
Diagnosis: Atrial fibrillation with RVR [195730]   Future Attending Provider: JERRI BRUCE [[id number]]   Reason for IP Medical Treatment  (Clinical interventions that can only be accomplished in the IP setting? ) :: cardiac care   Plans for Post-Acute care--if anticipated (pick the single best option):: A. No post acute care anticipated at this time   Special Needs:: Fall Risk [15]

## 2024-08-26 VITALS
WEIGHT: 180 LBS | OXYGEN SATURATION: 95 % | DIASTOLIC BLOOD PRESSURE: 72 MMHG | TEMPERATURE: 98 F | HEIGHT: 62 IN | SYSTOLIC BLOOD PRESSURE: 144 MMHG | HEART RATE: 74 BPM | BODY MASS INDEX: 33.13 KG/M2 | RESPIRATION RATE: 18 BRPM

## 2024-08-26 PROBLEM — I48.91 ATRIAL FIBRILLATION WITH RAPID VENTRICULAR RESPONSE: Status: ACTIVE | Noted: 2024-08-26

## 2024-08-26 PROBLEM — R79.89 ELEVATED TROPONIN: Status: ACTIVE | Noted: 2024-08-26

## 2024-08-26 LAB
AORTIC ROOT ANNULUS: 3.01 CM
ASCENDING AORTA: 3.42 CM
AV INDEX (PROSTH): 0.88
AV MEAN GRADIENT: 10 MMHG
AV PEAK GRADIENT: 17 MMHG
AV REGURGITATION PRESSURE HALF TIME: 830.15 MS
AV VALVE AREA BY VELOCITY RATIO: 2.69 CM²
AV VALVE AREA: 2.81 CM²
AV VELOCITY RATIO: 0.84
BSA FOR ECHO PROCEDURE: 1.89 M2
CV ECHO LV RWT: 0.85 CM
DOP CALC AO PEAK VEL: 2.07 M/S
DOP CALC AO VTI: 51.8 CM
DOP CALC LVOT AREA: 3.2 CM2
DOP CALC LVOT DIAMETER: 2.02 CM
DOP CALC LVOT PEAK VEL: 1.74 M/S
DOP CALC LVOT STROKE VOLUME: 145.42 CM3
DOP CALC RVOT PEAK VEL: 0.53 M/S
DOP CALC RVOT VTI: 13.5 CM
DOP CALCLVOT PEAK VEL VTI: 45.4 CM
E WAVE DECELERATION TIME: 377.65 MSEC
E/A RATIO: 1.03
E/E' RATIO: 11.47 M/S
ECHO LV POSTERIOR WALL: 1.26 CM (ref 0.6–1.1)
FRACTIONAL SHORTENING: 34 % (ref 28–44)
INTERVENTRICULAR SEPTUM: 1.41 CM (ref 0.6–1.1)
IVC DIAMETER: 1.59 CM
IVRT: 88.49 MSEC
LA MAJOR: 5.12 CM
LA MINOR: 5.35 CM
LA WIDTH: 3.9 CM
LEFT ATRIUM SIZE: 3.55 CM
LEFT ATRIUM VOLUME INDEX: 33.6 ML/M2
LEFT ATRIUM VOLUME: 61.58 CM3
LEFT INTERNAL DIMENSION IN SYSTOLE: 1.96 CM (ref 2.1–4)
LEFT VENTRICLE DIASTOLIC VOLUME INDEX: 18.7 ML/M2
LEFT VENTRICLE DIASTOLIC VOLUME: 34.23 ML
LEFT VENTRICLE MASS INDEX: 70 G/M2
LEFT VENTRICLE SYSTOLIC VOLUME INDEX: 6.6 ML/M2
LEFT VENTRICLE SYSTOLIC VOLUME: 12.14 ML
LEFT VENTRICULAR INTERNAL DIMENSION IN DIASTOLE: 2.97 CM (ref 3.5–6)
LEFT VENTRICULAR MASS: 128.07 G
LV LATERAL E/E' RATIO: 9.08 M/S
LV SEPTAL E/E' RATIO: 15.57 M/S
LVED V (TEICH): 34.23 ML
LVES V (TEICH): 12.14 ML
LVOT MG: 8.71 MMHG
LVOT MV: 1.43 CM/S
MV PEAK A VEL: 1.06 M/S
MV PEAK E VEL: 1.09 M/S
OHS QRS DURATION: 78 MS
OHS QRS DURATION: 78 MS
OHS QTC CALCULATION: 434 MS
OHS QTC CALCULATION: 479 MS
PISA AR MAX VEL: 4.36 M/S
PISA MRMAX VEL: 5.43 M/S
PISA TR MAX VEL: 2.73 M/S
POCT GLUCOSE: 107 MG/DL (ref 70–110)
PV MEAN GRADIENT: 1 MMHG
RA MAJOR: 4.15 CM
RA PRESSURE ESTIMATED: 3 MMHG
RA WIDTH: 3.1 CM
RIGHT VENTRICLE DIASTOLIC BASEL DIMENSION: 2.4 CM
RV TB RVSP: 6 MMHG
STJ: 3.19 CM
TDI LATERAL: 0.12 M/S
TDI SEPTAL: 0.07 M/S
TDI: 0.1 M/S
TR MAX PG: 30 MMHG
TRICUSPID ANNULAR PLANE SYSTOLIC EXCURSION: 2.33 CM
TROPONIN I SERPL DL<=0.01 NG/ML-MCNC: 0.04 NG/ML (ref 0–0.03)
TROPONIN I SERPL DL<=0.01 NG/ML-MCNC: 0.06 NG/ML (ref 0–0.03)
TROPONIN I SERPL DL<=0.01 NG/ML-MCNC: 0.07 NG/ML (ref 0–0.03)
TV REST PULMONARY ARTERY PRESSURE: 33 MMHG
Z-SCORE OF LEFT VENTRICULAR DIMENSION IN END DIASTOLE: -5.35
Z-SCORE OF LEFT VENTRICULAR DIMENSION IN END SYSTOLE: -3.7

## 2024-08-26 PROCEDURE — 93010 ELECTROCARDIOGRAM REPORT: CPT | Mod: ,,, | Performed by: INTERNAL MEDICINE

## 2024-08-26 PROCEDURE — 84484 ASSAY OF TROPONIN QUANT: CPT | Mod: 91 | Performed by: EMERGENCY MEDICINE

## 2024-08-26 PROCEDURE — 93005 ELECTROCARDIOGRAM TRACING: CPT

## 2024-08-26 PROCEDURE — 25000003 PHARM REV CODE 250: Performed by: PHYSICIAN ASSISTANT

## 2024-08-26 PROCEDURE — 25000003 PHARM REV CODE 250: Performed by: NURSE PRACTITIONER

## 2024-08-26 PROCEDURE — G0378 HOSPITAL OBSERVATION PER HR: HCPCS

## 2024-08-26 PROCEDURE — 99223 1ST HOSP IP/OBS HIGH 75: CPT | Mod: 25,,, | Performed by: INTERNAL MEDICINE

## 2024-08-26 PROCEDURE — 11000001 HC ACUTE MED/SURG PRIVATE ROOM

## 2024-08-26 PROCEDURE — 84484 ASSAY OF TROPONIN QUANT: CPT | Mod: 91 | Performed by: PHYSICIAN ASSISTANT

## 2024-08-26 PROCEDURE — 84484 ASSAY OF TROPONIN QUANT: CPT | Performed by: NURSE PRACTITIONER

## 2024-08-26 RX ORDER — IBUPROFEN 200 MG
24 TABLET ORAL
Status: DISCONTINUED | OUTPATIENT
Start: 2024-08-26 | End: 2024-08-26 | Stop reason: HOSPADM

## 2024-08-26 RX ORDER — TALC
6 POWDER (GRAM) TOPICAL NIGHTLY PRN
Status: DISCONTINUED | OUTPATIENT
Start: 2024-08-26 | End: 2024-08-26 | Stop reason: HOSPADM

## 2024-08-26 RX ORDER — CHOLECALCIFEROL (VITAMIN D3) 25 MCG
1 TABLET ORAL EVERY MORNING
COMMUNITY

## 2024-08-26 RX ORDER — LOVASTATIN 10 MG/1
10 TABLET ORAL NIGHTLY
COMMUNITY

## 2024-08-26 RX ORDER — SODIUM CHLORIDE 0.9 % (FLUSH) 0.9 %
3 SYRINGE (ML) INJECTION EVERY 12 HOURS PRN
Status: DISCONTINUED | OUTPATIENT
Start: 2024-08-26 | End: 2024-08-26 | Stop reason: HOSPADM

## 2024-08-26 RX ORDER — ONDANSETRON HYDROCHLORIDE 2 MG/ML
4 INJECTION, SOLUTION INTRAVENOUS EVERY 8 HOURS PRN
Status: DISCONTINUED | OUTPATIENT
Start: 2024-08-26 | End: 2024-08-26 | Stop reason: HOSPADM

## 2024-08-26 RX ORDER — SIMETHICONE 80 MG
1 TABLET,CHEWABLE ORAL 4 TIMES DAILY PRN
Status: DISCONTINUED | OUTPATIENT
Start: 2024-08-26 | End: 2024-08-26 | Stop reason: HOSPADM

## 2024-08-26 RX ORDER — METOPROLOL SUCCINATE 25 MG/1
25 TABLET, EXTENDED RELEASE ORAL DAILY
Status: DISCONTINUED | OUTPATIENT
Start: 2024-08-26 | End: 2024-08-26 | Stop reason: HOSPADM

## 2024-08-26 RX ORDER — GLUCAGON 1 MG
1 KIT INJECTION
Status: DISCONTINUED | OUTPATIENT
Start: 2024-08-26 | End: 2024-08-26 | Stop reason: HOSPADM

## 2024-08-26 RX ORDER — AMLODIPINE BESYLATE 5 MG/1
5 TABLET ORAL DAILY
COMMUNITY
Start: 2024-02-21

## 2024-08-26 RX ORDER — ASPIRIN 81 MG/1
81 TABLET ORAL DAILY
Status: DISCONTINUED | OUTPATIENT
Start: 2024-08-26 | End: 2024-08-26 | Stop reason: HOSPADM

## 2024-08-26 RX ORDER — IBUPROFEN 200 MG
16 TABLET ORAL
Status: DISCONTINUED | OUTPATIENT
Start: 2024-08-26 | End: 2024-08-26 | Stop reason: HOSPADM

## 2024-08-26 RX ORDER — POLYETHYLENE GLYCOL 3350 17 G/17G
17 POWDER, FOR SOLUTION ORAL DAILY PRN
Status: DISCONTINUED | OUTPATIENT
Start: 2024-08-26 | End: 2024-08-26 | Stop reason: HOSPADM

## 2024-08-26 RX ORDER — AMLODIPINE BESYLATE 5 MG/1
5 TABLET ORAL DAILY
Status: DISCONTINUED | OUTPATIENT
Start: 2024-08-26 | End: 2024-08-26 | Stop reason: HOSPADM

## 2024-08-26 RX ORDER — ACETAMINOPHEN 650 MG/1
650 SUPPOSITORY RECTAL EVERY 6 HOURS PRN
Status: DISCONTINUED | OUTPATIENT
Start: 2024-08-26 | End: 2024-08-26 | Stop reason: HOSPADM

## 2024-08-26 RX ORDER — ENOXAPARIN SODIUM 100 MG/ML
40 INJECTION SUBCUTANEOUS EVERY 24 HOURS
Status: DISCONTINUED | OUTPATIENT
Start: 2024-08-26 | End: 2024-08-26

## 2024-08-26 RX ORDER — KETOCONAZOLE 20 MG/G
CREAM TOPICAL DAILY
COMMUNITY

## 2024-08-26 RX ORDER — PROMETHAZINE HYDROCHLORIDE 25 MG/1
25 TABLET ORAL EVERY 6 HOURS PRN
Status: DISCONTINUED | OUTPATIENT
Start: 2024-08-26 | End: 2024-08-26 | Stop reason: HOSPADM

## 2024-08-26 RX ORDER — NALOXONE HCL 0.4 MG/ML
0.02 VIAL (ML) INJECTION
Status: DISCONTINUED | OUTPATIENT
Start: 2024-08-26 | End: 2024-08-26 | Stop reason: HOSPADM

## 2024-08-26 RX ORDER — ACETAMINOPHEN 325 MG/1
650 TABLET ORAL EVERY 6 HOURS PRN
Status: DISCONTINUED | OUTPATIENT
Start: 2024-08-26 | End: 2024-08-26 | Stop reason: HOSPADM

## 2024-08-26 RX ORDER — PRAVASTATIN SODIUM 10 MG/1
10 TABLET ORAL NIGHTLY
Status: DISCONTINUED | OUTPATIENT
Start: 2024-08-26 | End: 2024-08-26 | Stop reason: HOSPADM

## 2024-08-26 RX ORDER — ALUMINUM HYDROXIDE, MAGNESIUM HYDROXIDE, AND SIMETHICONE 1200; 120; 1200 MG/30ML; MG/30ML; MG/30ML
30 SUSPENSION ORAL 4 TIMES DAILY PRN
Status: DISCONTINUED | OUTPATIENT
Start: 2024-08-26 | End: 2024-08-26 | Stop reason: HOSPADM

## 2024-08-26 RX ORDER — METOPROLOL SUCCINATE 25 MG/1
25 TABLET, EXTENDED RELEASE ORAL DAILY
Qty: 90 TABLET | Refills: 3 | Status: SHIPPED | OUTPATIENT
Start: 2024-08-27 | End: 2025-08-27

## 2024-08-26 RX ORDER — ALBUTEROL SULFATE 90 UG/1
2 INHALANT RESPIRATORY (INHALATION) EVERY 4 HOURS PRN
COMMUNITY
Start: 2023-09-19

## 2024-08-26 RX ADMIN — METOPROLOL SUCCINATE 25 MG: 25 TABLET, FILM COATED, EXTENDED RELEASE ORAL at 11:08

## 2024-08-26 RX ADMIN — ASPIRIN 81 MG: 81 TABLET, COATED ORAL at 09:08

## 2024-08-26 RX ADMIN — APIXABAN 2.5 MG: 2.5 TABLET, FILM COATED ORAL at 11:08

## 2024-08-26 RX ADMIN — AMLODIPINE BESYLATE 5 MG: 5 TABLET ORAL at 09:08

## 2024-08-26 NOTE — HPI
Annel Arteaga is a 89 y.o. female with a PMH  has a past medical history of Allergy, Anesthesia, Arthritis, Asthma, Cataract, HEARING LOSS, High cholesterol, Hypertension, Joint pain, Mastodynia of left breast (6/25/2023), Obesity, Osteoporosis, unspecified, and Pneumonia.presented to the Emergency Department for evaluation of constant, moderate palpitations, sob and chest heaviness that radiates under her left arm which onset at approximately 20:00 tonight. The patient states that she returned home from [place] and sat on her couch just moments before her symptoms began. She does have a Hx of A-fib and takes 81 mg Aspirin.  Patient reports she was followed by Dr. Tucker Lovell with [facility].  States that she had a recent left heart catheterization in April/May of this year and was told that she 'had minimal blockages and everything looked good'.  Patient reports she was due to see Dr. Lovell in October of this year.  No mitigating or exacerbating factors reported. Patient denies any actual CP, fever, chills, cough, rhinorrhea, congestion, emesis, and all other sxs at this time. No prior Tx reported. No further complaints or concerns at this time.     ER workup revealed CBC to be unremarkable, CMP revealing BUN/creatinine of 26/1.0 with EGFR 54 and a CBG of 162 mg/dL, BNP negative, initial troponin negative with repeat troponin slightly elevated to 0.036, TSH 4.488 with free T4 0.93, chest x-ray negative for acute cardiopulmonary findings.  Initial EKG revealed undetermined rhythm with a ventricular rate of 133 beats per minute with a QT/QTC of 322/479.  After patient received 20 mg of Cardizem repeat EKG revealed:  Sinus Pipe with a ventricular rate of 57 beats per minute and a QT/QTC of 446/434.  Hospital Medicine consulted to admit patient for AFib with RVR and elevated troponin.  Patient in agreement with treatment plan.  Patient admitted under inpatient status.    PCP: Liliya Cuadra

## 2024-08-26 NOTE — PHARMACY MED REC
"Admission Medication History     The home medication history was taken by Samaria Patel.    You may go to "Admission" then "Reconcile Home Medications" tabs to review and/or act upon these items.     The home medication list has been updated by the Pharmacy department.   Please read ALL comments highlighted in yellow.   Please address this information as you see fit.    Feel free to contact us if you have any questions or require assistance.      The medications listed below were removed from the home medication list. Please reorder if appropriate:  Patient reports no longer taking the following medication(s):  Mucinex dm  mg  Sominex 25 mg  Vitamin E 4700 unit  Christal-C   Desonide 0.05% cream  B complex vitamins  Coenzyme Q10 200 mg  Lactobacillus acidophilus 10 billion cap    Medications listed below were obtained from: Patient/family and Analytic software- [employer]      [facility] REC COMPLETED:     Samaria Patel  [id number]    Current Outpatient Medications on File Prior to Encounter   Medication Sig Dispense Refill Last Dose    albuterol (PROVENTIL/VENTOLIN HFA) 90 mcg/actuation inhaler Inhale 2 puffs into the lungs every 4 (four) hours as needed.   8/25/2024    amLODIPine (NORVASC) 5 MG tablet Take 5 mg by mouth once daily.   8/25/2024    ascorbic acid, vitamin C, (VITAMIN C) 1000 MG tablet Take 1,000 mg by mouth.   8/25/2024    aspirin (ECOTRIN) 81 MG EC tablet Take 81 mg by mouth once daily.     8/25/2024    ERGOCALCIFEROL, VITAMIN D2, (VITAMIN D ORAL) Take by mouth once daily.   8/25/2024    fluticasone (FLONASE) 50 mcg/actuation nasal spray INSTILL 1 SPRAY IN EACH NOSTRIL TWICE DAILY 1 Bottle 6 Past Week    ketoconazole (NIZORAL) 2 % cream Apply topically once daily.   8/25/2024    lovastatin (MEVACOR) 10 MG tablet Take 10 mg by mouth every evening.   8/25/2024    methylcellulose (ARTIFICIAL TEARS) 1 % ophthalmic solution 1 drop as needed.     8/25/2024    montelukast (SINGULAIR) 10 mg tablet Take " 10 mg by mouth every evening.   8/25/2024    multivitamin (THERAGRAN) per tablet Take 1 tablet by mouth once daily.     8/25/2024    omeprazole (PRILOSEC) 20 MG capsule TAKE 2 CAPSULES BY MOUTH EVERY  capsule 0 8/25/2024    vitamin D (VITAMIN D3) 1000 units Tab Take 1 tablet by mouth every morning.   8/25/2024                           .

## 2024-08-26 NOTE — ED PROVIDER NOTES
SCRIBE #1 NOTE: I, Peggy Sy, am scribing for, and in the presence of, Libby Fournier MD. I have scribed the entire note.       History     Chief Complaint   Patient presents with    Palpitations     Elevated HR of 140 @ home/Pain under Left Arm. +Chest Heaviness/Afib HX     Review of patient's allergies indicates:   Allergen Reactions    Onion Shortness Of Breath    Zoloft [sertraline] Hives, Itching and Rash    Codeine Other (See Comments)     Flushing skin    Other Other (See Comments)     Pseudocholinesterase -- Paralysis    Pineapple Nausea And Vomiting    Prevacid [lansoprazole] Other (See Comments)     dysuria    Penicillins Rash    Vesicare [solifenacin] Hives and Rash         History of Present Illness     HPI    8/25/2024, 10:33 PM  History obtained from the patient      History of Present Illness: Annel Arteaga is a 89 y.o. female patient with a PMHx of atrial fibrillation, asthma, and HTN who presents to the Emergency Department for evaluation of constant, moderate palpitations which onset at approximately 20:00 tonight. The patient states that she returned home from [place] and sat on her couch just moments before her symptoms began. She does have a Hx of A-fib and takes 81 mg Aspirin. No mitigating or exacerbating factors reported. Associated sxs include chest heaviness and SOB. Patient denies any CP, fever, chills, cough, rhinorrhea, congestion, emesis, and all other sxs at this time. No prior Tx reported. No further complaints or concerns at this time.       Arrival mode: Personal vehicle    PCP: Liliya Cuadra MD        Past Medical History:  Past Medical History:   Diagnosis Date    Allergy     Anesthesia     Arthritis     Asthma     Atrial fibrillation with rapid ventricular response 8/26/2024    Cataract      both eyes CPG    HEARING LOSS     High cholesterol     Hypertension     Joint pain     osteopenia    Mastodynia of left breast 6/25/2023    Obesity     Osteoporosis, unspecified      Pneumonia     did not require hospitalization       Past Surgical History:  Past Surgical History:   Procedure Laterality Date    APPENDECTOMY      CATARACT EXTRACTION Bilateral     CPG    CATARACT EXTRACTION, BILATERAL  2006    HAND SURGERY      HYSTERECTOMY      STAPEDECTOMY      x 2    TONSILLECTOMY           Family History:  Family History   Problem Relation Name Age of Onset    Hypertension Mother      Glaucoma Father      Hypertension Father      Cancer Father          prostate     Hypertension Sister      Diabetes Sister      Heart disease Sister      Retinal detachment Sister      Glaucoma Brother      Hypertension Brother      Cancer Brother          Prostate    Hypertension Sister      Hypertension Sister      Hypertension Sister      Diabetes Daughter      Melanoma Neg Hx      Kidney disease Neg Hx      Stroke Neg Hx         Social History:  Social History     Tobacco Use    Smoking status: Never    Smokeless tobacco: Never   Substance and Sexual Activity    Alcohol use: No    Drug use: No    Sexual activity: Never     Birth control/protection: None        Review of Systems     Review of Systems   Constitutional:  Negative for chills and fever.   HENT:  Negative for congestion, rhinorrhea and sore throat.    Respiratory:  Positive for shortness of breath. Negative for cough.    Cardiovascular:  Positive for palpitations. Negative for chest pain.   Gastrointestinal:  Negative for nausea and vomiting.   Genitourinary:  Negative for dysuria.   Musculoskeletal:  Negative for back pain.   Skin:  Negative for rash.   Neurological:  Negative for weakness.   Hematological:  Does not bruise/bleed easily.   All other systems reviewed and are negative.     Physical Exam     Initial Vitals [08/25/24 2209]   BP Pulse Resp Temp SpO2   133/68 (!) 113 16 97.8 °F (36.6 °C) 95 %      MAP       --          Physical Exam  Nursing Notes and Vital Signs Reviewed.  Constitutional: Patient is in no acute distress.  Well-developed and well-nourished.  Head: Atraumatic. Normocephalic.  Eyes: PERRL. EOM intact. Conjunctivae are not pale. No scleral icterus.  ENT: Mucous membranes are moist. Oropharynx is clear and symmetric.    Neck: Supple. Full ROM. No lymphadenopathy.  Cardiovascular: Tachycardic. Irregularly irregular rhythm. No murmurs, rubs, or gallops. Distal pulses are 2+ and symmetric.  Pulmonary/Chest: No respiratory distress. Clear to auscultation bilaterally. No wheezing or rales.  Abdominal: Soft and non-distended.  There is no tenderness.  No rebound, guarding, or rigidity. Good bowel sounds.  Genitourinary: No CVA tenderness  Musculoskeletal: Moves all extremities. No obvious deformities. No edema. No calf tenderness.  Skin: Warm and dry.   Neurological:  Alert, awake, and appropriate.  Normal speech.  No acute focal neurological deficits are appreciated.  Psychiatric: Normal affect. Good eye contact. Appropriate in content.     ED Course   Critical Care    Date/Time: 8/26/2024 2:38 AM    Performed by: Libby Fournier MD  Authorized by: Libby Fournier MD  Direct patient critical care time: 20 minutes  Ordering / reviewing critical care time: 15 minutes  Documentation critical care time: 5 minutes  Consulting other physicians critical care time: 5 minutes  Total critical care time (exclusive of procedural time) : 45 minutes  Critical care time was exclusive of separately billable procedures and treating other patients and teaching time.  Critical care was necessary to treat or prevent imminent or life-threatening deterioration of the following conditions: cardiac failure (A-fib with RVR, NSTEMI).  Critical care was time spent personally by me on the following activities: blood draw for specimens, development of treatment plan with patient or surrogate, discussions with consultants, interpretation of cardiac output measurements, evaluation of patient's response to treatment, examination of patient, ordering and  performing treatments and interventions, obtaining history from patient or surrogate, ordering and review of laboratory studies, re-evaluation of patient's condition, review of old charts, ordering and review of radiographic studies and pulse oximetry.        ED Vital Signs:  Vitals:    08/25/24 2347 08/26/24 0003 08/26/24 0017 08/26/24 0024   BP: (!) 104/55 (!) 110/59 119/85    Pulse: 77 94 89 91   Resp: (!) 24  (!) 22 20   Temp:       TempSrc:       SpO2: 96% 97% 95% 95%   Weight:       Height:        08/26/24 0027 08/26/24 0033 08/26/24 0040 08/26/24 0102   BP:  (!) 109/55  (!) 114/56   Pulse:   (!) 51 (!) 54   Resp:   20 (!) 22   Temp: 98.2 °F (36.8 °C)      TempSrc:       SpO2:   96% 95%   Weight:       Height:        08/26/24 0126 08/26/24 0142 08/26/24 0233 08/26/24 0243   BP:   (!) 126/58    Pulse: (!) 52 (!) 53  (!) 58   Resp: 18 19  20   Temp:       TempSrc:       SpO2: 96% 97%  97%   Weight:       Height:        08/26/24 0333 08/26/24 0355 08/26/24 0357   BP: (!) 115/56     Pulse: 60 61 61   Resp: 18 17 16   Temp:   97.9 °F (36.6 °C)   TempSrc:   Oral   SpO2: 96% 95% 95%   Weight:      Height:          Abnormal Lab Results:  Labs Reviewed   COMPREHENSIVE METABOLIC PANEL - Abnormal       Result Value    Sodium 141      Potassium 3.6      Chloride 109      CO2 17 (*)     Glucose 162 (*)     BUN 26 (*)     Creatinine 1.0      Calcium 9.1      Total Protein 7.3      Albumin 3.7      Total Bilirubin 0.3      Alkaline Phosphatase 75      AST 21      ALT 14      eGFR 54 (*)     Anion Gap 15     TSH - Abnormal    TSH 4.488 (*)    TROPONIN I - Abnormal    Troponin I 0.036 (*)    TROPONIN I - Abnormal    Troponin I 0.074 (*)    CBC W/ AUTO DIFFERENTIAL    WBC 9.36      RBC 4.11      Hemoglobin 12.4      Hematocrit 37.2      MCV 91      MCH 30.2      MCHC 33.3      RDW 13.6      Platelets 252      MPV 10.9      Immature Granulocytes 0.2      Gran # (ANC) 5.2      Immature Grans (Abs) 0.02      Lymph # 3.1      Mono  # 0.8      Eos # 0.2      Baso # 0.04      nRBC 0      Gran % 55.6      Lymph % 33.3      Mono % 8.3      Eosinophil % 2.2      Basophil % 0.4      Differential Method Automated     B-TYPE NATRIURETIC PEPTIDE    BNP 87     TROPONIN I    Troponin I <0.006     T4, FREE    Free T4 0.93          All Lab Results:  Results for orders placed or performed during the hospital encounter of 08/25/24   CBC auto differential   Result Value Ref Range    WBC 9.36 3.90 - 12.70 K/uL    RBC 4.11 4.00 - 5.40 M/uL    Hemoglobin 12.4 12.0 - 16.0 g/dL    Hematocrit 37.2 37.0 - 48.5 %    MCV 91 82 - 98 fL    MCH 30.2 27.0 - 31.0 pg    MCHC 33.3 32.0 - 36.0 g/dL    RDW 13.6 11.5 - 14.5 %    Platelets 252 150 - 450 K/uL    MPV 10.9 9.2 - 12.9 fL    Immature Granulocytes 0.2 0.0 - 0.5 %    Gran # (ANC) 5.2 1.8 - 7.7 K/uL    Immature Grans (Abs) 0.02 0.00 - 0.04 K/uL    Lymph # 3.1 1.0 - 4.8 K/uL    Mono # 0.8 0.3 - 1.0 K/uL    Eos # 0.2 0.0 - 0.5 K/uL    Baso # 0.04 0.00 - 0.20 K/uL    nRBC 0 0 /100 WBC    Gran % 55.6 38.0 - 73.0 %    Lymph % 33.3 18.0 - 48.0 %    Mono % 8.3 4.0 - 15.0 %    Eosinophil % 2.2 0.0 - 8.0 %    Basophil % 0.4 0.0 - 1.9 %    Differential Method Automated    Comprehensive metabolic panel   Result Value Ref Range    Sodium 141 136 - 145 mmol/L    Potassium 3.6 3.5 - 5.1 mmol/L    Chloride 109 95 - 110 mmol/L    CO2 17 (L) 23 - 29 mmol/L    Glucose 162 (H) 70 - 110 mg/dL    BUN 26 (H) 8 - 23 mg/dL    Creatinine 1.0 0.5 - 1.4 mg/dL    Calcium 9.1 8.7 - 10.5 mg/dL    Total Protein 7.3 6.0 - 8.4 g/dL    Albumin 3.7 3.5 - 5.2 g/dL    Total Bilirubin 0.3 0.1 - 1.0 mg/dL    Alkaline Phosphatase 75 55 - 135 U/L    AST 21 10 - 40 U/L    ALT 14 10 - 44 U/L    eGFR 54 (A) >60 mL/min/1.73 m^2    Anion Gap 15 8 - 16 mmol/L   Brain natriuretic peptide   Result Value Ref Range    BNP 87 0 - 99 pg/mL   Troponin I   Result Value Ref Range    Troponin I <0.006 0.000 - 0.026 ng/mL   TSH   Result Value Ref Range    TSH 4.488 (H) 0.400 -  4.000 uIU/mL   T4, Free   Result Value Ref Range    Free T4 0.93 0.71 - 1.51 ng/dL   Troponin I   Result Value Ref Range    Troponin I 0.036 (H) 0.000 - 0.026 ng/mL   Troponin I   Result Value Ref Range    Troponin I 0.074 (H) 0.000 - 0.026 ng/mL         Imaging Results:  Imaging Results              X-Ray Chest AP Portable (Final result)  Result time 08/25/24 23:03:28      Final result by Faye Newell MD (08/25/24 23:03:28)                   Impression:      Stable chest exam      Electronically signed by: Faye Newell  Date:    08/25/2024  Time:    23:03               Narrative:    EXAMINATION:  XR CHEST AP PORTABLE    CLINICAL HISTORY:  Chest pain, unspecified    TECHNIQUE:  Single frontal portable view of the chest was performed.    COMPARISON:  None    FINDINGS:  No pulmonary consolidation or pleural effusion                                       The EKG was ordered, reviewed, and independently interpreted by the ED provider.  Interpretation time: 22:15  Rate: 133 BPM  Rhythm: atrial fibrillation with rapid ventricular response and premature ventricular contractions (PVC)  Interpretation: Septal infarct, age undetermined. No STEMI.    Interpretation time: 02:19  Rate: 57 BPM  Rhythm: sinus bradycardia  Interpretation: Septal infarct, age undetermined. No STEMI.           The Emergency Provider reviewed the vital signs and test results, which are outlined above.     ED Discussion     2:37 AM: Discussed case with Dejuan Lares MD (Hospital Medicine). Dr. Lares agrees with current care and management of pt and accepts admission.   Admitting Service: Hospital Medicine  Admitting Physician: Dr. Lares  Admit to: Inpatient Med Tele    2:37 AM: Re-evaluated pt. I have discussed test results, shared treatment plan, and the need for admission with patient and family at bedside. Pt and family express understanding at this time and agree with all information. All questions answered. Pt and family  have no further questions or concerns at this time. Pt is ready for admit.         Medical Decision Making  Amount and/or Complexity of Data Reviewed  Labs: ordered. Decision-making details documented in ED Course.  Radiology: ordered. Decision-making details documented in ED Course.  ECG/medicine tests: ordered and independent interpretation performed. Decision-making details documented in ED Course.    Risk  Prescription drug management.  Decision regarding hospitalization.                ED Medication(s):  Medications   sodium chloride 0.9% flush 3 mL (has no administration in time range)   melatonin tablet 6 mg (has no administration in time range)   ondansetron injection 4 mg (has no administration in time range)   promethazine tablet 25 mg (has no administration in time range)   polyethylene glycol packet 17 g (has no administration in time range)   acetaminophen tablet 650 mg (has no administration in time range)   simethicone chewable tablet 80 mg (has no administration in time range)   aluminum-magnesium hydroxide-simethicone 200-200-20 mg/5 mL suspension 30 mL (has no administration in time range)   acetaminophen suppository 650 mg (has no administration in time range)   naloxone 0.4 mg/mL injection 0.02 mg (has no administration in time range)   glucose chewable tablet 16 g (has no administration in time range)   glucose chewable tablet 24 g (has no administration in time range)   glucagon (human recombinant) injection 1 mg (has no administration in time range)   enoxaparin injection 40 mg (has no administration in time range)   dextrose 10% bolus 125 mL 125 mL (has no administration in time range)   dextrose 10% bolus 250 mL 250 mL (has no administration in time range)   amLODIPine tablet 5 mg (has no administration in time range)   aspirin EC tablet 81 mg (has no administration in time range)   pravastatin tablet 10 mg (has no administration in time range)   diltiaZEM injection 20 mg (20 mg Intravenous  Given 8/25/24 0220)       New Prescriptions    No medications on file               Scribe Attestation:   Scribe #1: I performed the above scribed service and the documentation accurately describes the services I performed. I attest to the accuracy of the note.     Attending:   Physician Attestation Statement for Scribe #1: I, Libby Fournier MD, personally performed the services described in this documentation, as scribed by Peggy Sy, in my presence, and it is both accurate and complete.           Clinical Impression       ICD-10-CM ICD-9-CM   1. Atrial fibrillation with RVR  I48.91 427.31   2. Sensation of chest pressure  R07.89 786.59   3. Chest pain  R07.9 786.50   4. PVC's (premature ventricular contractions)  I49.3 427.69   5. NSTEMI (non-ST elevated myocardial infarction)  I21.4 410.70   6. Atrial fibrillation with rapid ventricular response  I48.91 427.31       Disposition:   Disposition: Admitted  Condition: Fair         Libby Fournier MD  08/26/24 0561

## 2024-08-26 NOTE — ED NOTES
Pt presents with c/o feeling like her heart is racing and chest heaviness. Pt reports symptoms began around 830pm while watching tv and has only worsened. Pt also reports intermittent SOB. EKG obtained, cardiac monitor, continuous pulse ox and nibp cuff placed. Pt placed on 2L o2/nc for o2 sat 91% on RA. Pt reports great improvement with Sob after oxygen placement. Call light within reach. Family member at beside. Pt aware of POC and v/u.

## 2024-08-26 NOTE — ED NOTES
Pt reports improvement in palpitations. BP noted to be lowered. Pt asymptomatic. MD notified. No further orders at this time

## 2024-08-26 NOTE — ASSESSMENT & PLAN NOTE
Patient with Paroxysmal (<7 days) atrial fibrillation which is controlled currently with Beta Blocker and IV dose of Cardizem . Patient is currently in sinus Pipe.[id number] Score: 3. HASBLED Score: . Anticoagulation done with 81 mg aspirin..  Cardiology consulted. TTE. Tele monitoring.  Trend troponin.

## 2024-08-26 NOTE — ASSESSMENT & PLAN NOTE
-History of PAF  -Converted to SR post IVP cardizem  -Start Toprol XL 25 mg daily  -Risks/benefits of AC discussed in detail, start Eliquis 2.5 mg BID, anti-Xa lab as OP  -Check TTE

## 2024-08-26 NOTE — SUBJECTIVE & OBJECTIVE
Past Medical History:   Diagnosis Date    Allergy     Anesthesia     Arthritis     Asthma     Cataract      both eyes CPG    HEARING LOSS     High cholesterol     Hypertension     Joint pain     osteopenia    Mastodynia of left breast 6/25/2023    Obesity     Osteoporosis, unspecified     Pneumonia     did not require hospitalization       Past Surgical History:   Procedure Laterality Date    APPENDECTOMY      CATARACT EXTRACTION Bilateral     CPG    CATARACT EXTRACTION, BILATERAL  2006    HAND SURGERY      HYSTERECTOMY      STAPEDECTOMY      x 2    TONSILLECTOMY         Review of patient's allergies indicates:   Allergen Reactions    Onion Shortness Of Breath    Zoloft [sertraline] Hives, Itching and Rash    Codeine Other (See Comments)     Flushing skin    Other Other (See Comments)     Pseudocholinesterase -- Paralysis    Pineapple Nausea And Vomiting    Prevacid [lansoprazole] Other (See Comments)     dysuria    Penicillins Rash    Vesicare [solifenacin] Hives and Rash       No current facility-administered medications on file prior to encounter.     Current Outpatient Medications on File Prior to Encounter   Medication Sig    amlodipine (NORVASC) 5 MG tablet Take 5 mg by mouth once daily.     ASCORBATE CALCIUM (KELL-C ORAL) Take by mouth.      ascorbic acid, vitamin C, (VITAMIN C) 1000 MG tablet Take 1,000 mg by mouth.    aspirin (ECOTRIN) 81 MG EC tablet Take 81 mg by mouth once daily.      b complex vitamins tablet Take 1 tablet by mouth once daily.    biotin 10 mg Tab Take 1 tablet by mouth once daily.    coenzyme Q10 200 mg capsule Take 200 mg by mouth.    desonide (DESOWEN) 0.05 % cream Apply topically 2 (two) times daily. Mix 1:1 and use with ketoconazole cream BID prn for rash, use as needed for rash of face    dextromethorphan-guaifenesin  mg (MUCINEX DM)  mg per 12 hr tablet Take 1 tablet by mouth every 12 (twelve) hours as needed.    diphenhydrAMINE (SOMINEX) 25 mg tablet Take 25 mg by  mouth nightly as needed.      DOCUSATE CALCIUM (STOOL SOFTENER ORAL) Take by mouth once daily.    ERGOCALCIFEROL, VITAMIN D2, (VITAMIN D ORAL) Take by mouth once daily.    fluticasone (FLONASE) 50 mcg/actuation nasal spray INSTILL 1 SPRAY IN EACH NOSTRIL TWICE DAILY    ketoconazole (NIZORAL) 2 % cream AS DIRECTED TWICE DAILY AS NEEDED    Lactobacillus acidophilus 10 billion cell Cap Take 200 mg by mouth.    methylcellulose (ARTIFICIAL TEARS) 1 % ophthalmic solution 1 drop as needed.      metoprolol tartrate (LOPRESSOR) 25 MG tablet TK 1 T PO D    montelukast (SINGULAIR) 10 mg tablet Take 10 mg by mouth every evening.    multivitamin (THERAGRAN) per tablet Take 1 tablet by mouth once daily.      omeprazole (PRILOSEC) 20 MG capsule TAKE 2 CAPSULES BY MOUTH EVERY DAY    TURMERIC ORAL Take 400 mg by mouth.    vitamin E 400 UNIT capsule Take 400 Units by mouth once daily.       Family History       Problem Relation (Age of Onset)    Cancer Father, Brother    Diabetes Sister, Daughter    Glaucoma Father, Brother    Heart disease Sister    Hypertension Mother, Father, Sister, Brother, Sister, Sister, Sister    Retinal detachment Sister          Tobacco Use    Smoking status: Never    Smokeless tobacco: Never   Substance and Sexual Activity    Alcohol use: No    Drug use: No    Sexual activity: Never     Birth control/protection: None     Review of Systems   Constitutional:  Negative for chills and diaphoresis.   Respiratory:  Positive for shortness of breath. Negative for cough and wheezing.    Cardiovascular:  Positive for chest pain and palpitations. Negative for leg swelling.   Gastrointestinal:  Negative for abdominal pain, diarrhea, nausea and vomiting.   Neurological:  Negative for dizziness, light-headedness and headaches.   All other systems reviewed and are negative.    Objective:     Vital Signs (Most Recent):  Temp: 98.2 °F (36.8 °C) (08/26/24 0027)  Pulse: (!) 58 (08/26/24 0243)  Resp: 20 (08/26/24 0243)  BP:  (!) 126/58 (08/26/24 0233)  SpO2: 97 % (08/26/24 0243) Vital Signs (24h Range):  Temp:  [97.8 °F (36.6 °C)-98.2 °F (36.8 °C)] 98.2 °F (36.8 °C)  Pulse:  [] 58  Resp:  [16-25] 20  SpO2:  [94 %-97 %] 97 %  BP: ()/(51-96) 126/58     Weight: 81.6 kg (180 lb)  Body mass index is 32.92 kg/m².     Physical Exam  Vitals and nursing note reviewed.   Constitutional:       General: She is awake. She is not in acute distress.     Appearance: Normal appearance. She is well-developed and well-groomed. She is not ill-appearing, toxic-appearing or diaphoretic.   HENT:      Head: Normocephalic and atraumatic.   Eyes:      Extraocular Movements: Extraocular movements intact.      Conjunctiva/sclera: Conjunctivae normal.   Cardiovascular:      Rate and Rhythm: Regular rhythm. Bradycardia present.      Heart sounds: Normal heart sounds. No murmur heard.  Pulmonary:      Effort: Pulmonary effort is normal.      Breath sounds: Normal breath sounds. No decreased breath sounds, wheezing, rhonchi or rales.   Abdominal:      General: Bowel sounds are normal.      Palpations: Abdomen is soft.      Tenderness: There is no abdominal tenderness.   Musculoskeletal:      Cervical back: Normal range of motion and neck supple.      Right lower leg: No edema.      Left lower leg: No edema.      Comments: 5/5 strength throughout   Skin:     General: Skin is warm and dry.      Capillary Refill: Capillary refill takes less than 2 seconds.   Neurological:      General: No focal deficit present.      Mental Status: She is alert and oriented to person, place, and time. Mental status is at baseline.      GCS: GCS eye subscore is 4. GCS verbal subscore is 5. GCS motor subscore is 6.      Cranial Nerves: Cranial nerves 2-12 are intact.      Sensory: Sensation is intact.      Motor: Motor function is intact.   Psychiatric:         Mood and Affect: Mood normal.         Speech: Speech normal.         Behavior: Behavior normal. Behavior is  [facility].              LABS:  Recent Results (from the past 24 hour(s))   CBC auto differential    Collection Time: 08/25/24 10:29 PM   Result Value Ref Range    WBC 9.36 3.90 - 12.70 K/uL    RBC 4.11 4.00 - 5.40 M/uL    Hemoglobin 12.4 12.0 - 16.0 g/dL    Hematocrit 37.2 37.0 - 48.5 %    MCV 91 82 - 98 fL    MCH 30.2 27.0 - 31.0 pg    MCHC 33.3 32.0 - 36.0 g/dL    RDW 13.6 11.5 - 14.5 %    Platelets 252 150 - 450 K/uL    MPV 10.9 9.2 - 12.9 fL    Immature Granulocytes 0.2 0.0 - 0.5 %    Gran # (ANC) 5.2 1.8 - 7.7 K/uL    Immature Grans (Abs) 0.02 0.00 - 0.04 K/uL    Lymph # 3.1 1.0 - 4.8 K/uL    Mono # 0.8 0.3 - 1.0 K/uL    Eos # 0.2 0.0 - 0.5 K/uL    Baso # 0.04 0.00 - 0.20 K/uL    nRBC 0 0 /100 WBC    Gran % 55.6 38.0 - 73.0 %    Lymph % 33.3 18.0 - 48.0 %    Mono % 8.3 4.0 - 15.0 %    Eosinophil % 2.2 0.0 - 8.0 %    Basophil % 0.4 0.0 - 1.9 %    Differential Method Automated    Comprehensive metabolic panel    Collection Time: 08/25/24 10:29 PM   Result Value Ref Range    Sodium 141 136 - 145 mmol/L    Potassium 3.6 3.5 - 5.1 mmol/L    Chloride 109 95 - 110 mmol/L    CO2 17 (L) 23 - 29 mmol/L    Glucose 162 (H) 70 - 110 mg/dL    BUN 26 (H) 8 - 23 mg/dL    Creatinine 1.0 0.5 - 1.4 mg/dL    Calcium 9.1 8.7 - 10.5 mg/dL    Total Protein 7.3 6.0 - 8.4 g/dL    Albumin 3.7 3.5 - 5.2 g/dL    Total Bilirubin 0.3 0.1 - 1.0 mg/dL    Alkaline Phosphatase 75 55 - 135 U/L    AST 21 10 - 40 U/L    ALT 14 10 - 44 U/L    eGFR 54 (A) >60 mL/min/1.73 m^2    Anion Gap 15 8 - 16 mmol/L   Brain natriuretic peptide    Collection Time: 08/25/24 10:29 PM   Result Value Ref Range    BNP 87 0 - 99 pg/mL   Troponin I    Collection Time: 08/25/24 10:29 PM   Result Value Ref Range    Troponin I <0.006 0.000 - 0.026 ng/mL   TSH    Collection Time: 08/25/24 10:29 PM   Result Value Ref Range    TSH 4.488 (H) 0.400 - 4.000 uIU/mL   T4, Free    Collection Time: 08/25/24 10:29 PM   Result Value Ref Range    Free T4 0.93 0.71 - 1.51 ng/dL    Troponin I    Collection Time: 08/26/24  1:18 AM   Result Value Ref Range    Troponin I 0.036 (H) 0.000 - 0.026 ng/mL       RADIOLOGY  X-Ray Chest AP Portable    Result Date: 8/25/2024  EXAMINATION: XR CHEST AP PORTABLE CLINICAL HISTORY: Chest pain, unspecified TECHNIQUE: Single frontal portable view of the chest was performed. COMPARISON: None FINDINGS: No pulmonary consolidation or pleural effusion     Stable chest exam Electronically signed by: Faye Newell Date:    08/25/2024 Time:    23:03      EKG    MICROBIOLOGY    MDM     Amount and/or Complexity of Data Reviewed  Clinical lab tests: reviewed  Tests in the radiology section of CPT®: reviewed  Tests in the medicine section of CPT®: reviewed  Discussion of test results with the performing providers: yes  Decide to obtain previous medical records or to obtain history from someone other than the patient: yes  Obtain history from someone other than the patient: yes  Review and summarize past medical records: yes  Discuss the patient with other providers: yes  Independent visualization of images, tracings, or specimens: yes

## 2024-08-26 NOTE — PLAN OF CARE
[facility] - Emergency Dept.  Initial Discharge Assessment       Primary Care Provider: Liliya Cuadra MD    Admission Diagnosis: Atrial fibrillation with RVR [I48.91]    Admission Date: 8/25/2024  Expected Discharge Date:     Transition of Care Barriers: (P) None    Payor: HU[employer] MANAGED MEDICARE / Plan: HU[employer]NA MEDICARE HMO / Product Type: Capitation /     Extended Emergency Contact Information  Primary Emergency Contact: Chencho Arteaga   [place]  Mobile Phone: [phone]  Relation: Son  Secondary Emergency Contact: Mai Levi   [place]  Mobile Phone: [phone]  Relation: Daughter    Discharge Plan A: (P) Home         [facility] - [place], [place] - [address]  [address] [facility] [address]  Phone: [phone] Fax: [phone]      Initial Assessment (most recent)       Adult Discharge Assessment - 08/26/24 1013          Discharge Assessment    Assessment Type Discharge Planning Assessment     Confirmed/corrected address, phone number and insurance Yes     Confirmed Demographics Correct on Facesheet     Source of Information patient     When was your last doctors appointment? 07/10/24 (P)      Does patient/caregiver understand observation status Yes (P)      Communicated JULIETTE with patient/caregiver No (P)      Reason For Admission Atrial fibrillation (P)      People in Home child(pradeep), adult (P)      Facility Arrived From: Home (P)      Do you expect to return to your current living situation? Yes (P)      Do you have help at home or someone to help you manage your care at home? Yes (P)      Who are your caregiver(s) and their phone number(s)? Chencho Arteaga-son [phone] (P)      Prior to hospitilization cognitive status: Alert/Oriented (P)      Current cognitive status: Alert/Oriented (P)      Walking or Climbing Stairs Difficulty no (P)      Dressing/Bathing Difficulty no (P)      Home Accessibility not wheelchair accessible (P)       Home Layout Able to live on 1st floor (P)      Equipment Currently Used at Home walker, rolling;rollator;cane, straight (P)      Readmission within 30 days? No (P)      Patient currently being followed by outpatient case management? No (P)      Do you currently have service(s) that help you manage your care at home? No (P)      Do you have prescription coverage? Yes (P)      Coverage HU[employer] MANAGED MEDICARE - HU[employer] MEDICARE O (P)      Do you have any problems affording any of your prescribed medications? No (P)      Who is going to help you get home at discharge? Chencho Arteaga-son [phone] (P)      How do you get to doctors appointments? car, drives self (P)      Discharge Plan A Home (P)      DME Needed Upon Discharge  none (P)      Discharge Plan discussed with: Patient (P)      Transition of Care Barriers None (P)         Physical Activity    On average, how many days per week do you engage in moderate to strenuous exercise (like a brisk walk)? 0 days (P)      On average, how many minutes do you engage in exercise at this level? 0 min (P)         Financial Resource Strain    How hard is it for you to pay for the very basics like food, housing, medical care, and heating? Not hard at all (P)         Housing Stability    In the last 12 months, was there a time when you were not able to pay the mortgage or rent on time? No (P)      At any time in the past 12 months, were you homeless or living in a shelter (including now)? No (P)         Transportation Needs    Has the lack of transportation kept you from medical appointments, meetings, work or from getting things needed for daily living? No (P)         Food Insecurity    Within the past 12 months, you worried that your food would run out before you got the money to buy more. Never true (P)      Within the past 12 months, the food you bought just didn't last and you didn't have money to get more. Never true (P)         Stress    Do you feel stress - tense,  restless, nervous, or anxious, or unable to sleep at night because your mind is troubled all the time - these days? Not at all (P)         Social Isolation    How often do you feel lonely or isolated from those around you?  Never (P)         Alcohol Use    Q1: How often do you have a drink containing alcohol? Never (P)      Q2: How many drinks containing alcohol do you have on a typical day when you are drinking? Patient does not drink (P)      Q3: How often do you have six or more drinks on one occasion? Never (P)         [employer]    In the past 12 months has the electric, gas, oil, or water company threatened to shut off services in your home? No (P)         Health Literacy    How often do you need to have someone help you when you read instructions, pamphlets, or other written material from your doctor or pharmacy? Never (P)         OTHER    Name(s) of People in Home Chencho Eric [phone] (P)                       Sw met with patient at bedside to complete initial assessment. Patient states all information was correct on the facesheet. Patient lives with her son Chencho Arteaga. Patient will get help at discharge from son.  Patient states she does not use any equipment at home; however, she does have a rolling walker, cane, and rollater.  Patient is not taking any bloodthinner medications. Patient does not receive any outside services.  Patient is current with Dr. Néstor Hoffman for her PCP..  Patient uses [facility] for her pharmacy. When asked about the [facility] patient denies having any barriers. No needs at this time.

## 2024-08-26 NOTE — SUBJECTIVE & OBJECTIVE
Past Medical History:   Diagnosis Date    Allergy     Anesthesia     Arthritis     Asthma     Atrial fibrillation with rapid ventricular response 8/26/2024    Cataract      both eyes CPG    HEARING LOSS     High cholesterol     Hypertension     Joint pain     osteopenia    Mastodynia of left breast 6/25/2023    Obesity     Osteoporosis, unspecified     Pneumonia     did not require hospitalization       Past Surgical History:   Procedure Laterality Date    APPENDECTOMY      CATARACT EXTRACTION Bilateral     CPG    CATARACT EXTRACTION, BILATERAL  2006    HAND SURGERY      HYSTERECTOMY      STAPEDECTOMY      x 2    TONSILLECTOMY         Review of patient's allergies indicates:   Allergen Reactions    Onion Shortness Of Breath    Zoloft [sertraline] Hives, Itching and Rash    Codeine Other (See Comments)     Flushing skin    Other Other (See Comments)     Pseudocholinesterase -- Paralysis    Pineapple Nausea And Vomiting    Prevacid [lansoprazole] Other (See Comments)     dysuria    Penicillins Rash    Vesicare [solifenacin] Hives and Rash       No current facility-administered medications on file prior to encounter.     Current Outpatient Medications on File Prior to Encounter   Medication Sig    amlodipine (NORVASC) 5 MG tablet Take 5 mg by mouth once daily.     aspirin (ECOTRIN) 81 MG EC tablet Take 81 mg by mouth once daily.      biotin 10 mg Tab Take 1 tablet by mouth once daily.    ERGOCALCIFEROL, VITAMIN D2, (VITAMIN D ORAL) Take by mouth once daily.    fluticasone (FLONASE) 50 mcg/actuation nasal spray INSTILL 1 SPRAY IN EACH NOSTRIL TWICE DAILY    lovastatin (MEVACOR) 10 MG tablet Take 10 mg by mouth every evening.    methylcellulose (ARTIFICIAL TEARS) 1 % ophthalmic solution 1 drop as needed.      metoprolol tartrate (LOPRESSOR) 25 MG tablet TK 1 T PO D    montelukast (SINGULAIR) 10 mg tablet Take 10 mg by mouth every evening.    multivitamin (THERAGRAN) per tablet Take 1 tablet by mouth once daily.       omeprazole (PRILOSEC) 20 MG capsule TAKE 2 CAPSULES BY MOUTH EVERY DAY    TURMERIC ORAL Take 400 mg by mouth.    ASCORBATE CALCIUM (KELL-C ORAL) Take by mouth.      ascorbic acid, vitamin C, (VITAMIN C) 1000 MG tablet Take 1,000 mg by mouth.    b complex vitamins tablet Take 1 tablet by mouth once daily.    coenzyme Q10 200 mg capsule Take 200 mg by mouth.    desonide (DESOWEN) 0.05 % cream Apply topically 2 (two) times daily. Mix 1:1 and use with ketoconazole cream BID prn for rash, use as needed for rash of face    dextromethorphan-guaifenesin  mg (MUCINEX DM)  mg per 12 hr tablet Take 1 tablet by mouth every 12 (twelve) hours as needed.    diphenhydrAMINE (SOMINEX) 25 mg tablet Take 25 mg by mouth nightly as needed.      DOCUSATE CALCIUM (STOOL SOFTENER ORAL) Take by mouth once daily.    ketoconazole (NIZORAL) 2 % cream AS DIRECTED TWICE DAILY AS NEEDED    Lactobacillus acidophilus 10 billion cell Cap Take 200 mg by mouth.    vitamin E 400 UNIT capsule Take 400 Units by mouth once daily.       Family History       Problem Relation (Age of Onset)    Cancer Father, Brother    Diabetes Sister, Daughter    Glaucoma Father, Brother    Heart disease Sister    Hypertension Mother, Father, Sister, Brother, Sister, Sister, Sister    Retinal detachment Sister          Tobacco Use    Smoking status: Never    Smokeless tobacco: Never   Substance and Sexual Activity    Alcohol use: No    Drug use: No    Sexual activity: Never     Birth control/protection: None     Review of Systems   Constitutional: Positive for malaise/fatigue.   HENT: Negative.     Cardiovascular:  Positive for chest pain (resolved) and dyspnea on exertion.   Respiratory:  Positive for shortness of breath.    Endocrine: Negative.    Hematologic/Lymphatic: Negative.    Skin: Negative.    Musculoskeletal:  Positive for arthritis (L arm).   Gastrointestinal: Negative.    Genitourinary: Negative.    Neurological: Negative.     Psychiatric/Behavioral: Negative.     Allergic/Immunologic: Negative.      Objective:     Vital Signs (Most Recent):  Temp: 97.6 °F (36.4 °C) (08/26/24 0720)  Pulse: 70 (08/26/24 0904)  Resp: (!) 22 (08/26/24 0904)  BP: (!) 160/67 (08/26/24 0904)  SpO2: 96 % (08/26/24 0904) Vital Signs (24h Range):  Temp:  [97.6 °F (36.4 °C)-98.2 °F (36.8 °C)] 97.6 °F (36.4 °C)  Pulse:  [] 70  Resp:  [16-25] 22  SpO2:  [94 %-98 %] 96 %  BP: ()/(51-96) 160/67     Weight: 81.6 kg (180 lb)  Body mass index is 32.92 kg/m².    SpO2: 96 %       No intake or output data in the 24 hours ending 08/26/24 1045    Lines/Drains/Airways       Peripheral Intravenous Line  Duration                  Peripheral IV - Single Lumen 08/25/24 2229 20 G Right Hand <1 day                     Physical Exam  Vitals and nursing note reviewed.   Constitutional:       General: She is not in acute distress.     Appearance: Normal appearance. She is well-developed. She is not diaphoretic.   HENT:      Head: Normocephalic and atraumatic.   Eyes:      General:         Right eye: No discharge.         Left eye: No discharge.      Pupils: Pupils are equal, round, and reactive to light.   Cardiovascular:      Rate and Rhythm: Normal rate and regular rhythm.      Heart sounds: Normal heart sounds, S1 normal and S2 normal. No murmur heard.     Comments: Currently in SR  Pulmonary:      Effort: Pulmonary effort is normal. No respiratory distress.      Breath sounds: Normal breath sounds.   Abdominal:      General: There is no distension.   Musculoskeletal:      Right lower leg: No edema.      Left lower leg: No edema.   Skin:     General: Skin is warm and dry.      Findings: No erythema.   Neurological:      Mental Status: She is alert and oriented to person, place, and time.   Psychiatric:         Mood and Affect: Mood normal.         Behavior: Behavior normal.          Significant Labs: CMP   Recent Labs   Lab 08/25/24  2229      K 3.6      CO2  17*   *   BUN 26*   CREATININE 1.0   CALCIUM 9.1   PROT 7.3   ALBUMIN 3.7   BILITOT 0.3   ALKPHOS 75   AST 21   ALT 14   ANIONGAP 15   , CBC   Recent Labs   Lab 08/25/24 2229   WBC 9.36   HGB 12.4   HCT 37.2      , Troponin   Recent Labs   Lab 08/25/24  2229 08/26/24  0118 08/26/24  0423   TROPONINI <0.006 0.036* 0.074*   , and All pertinent lab results from the last 24 hours have been reviewed.    Significant Imaging: Echocardiogram: Transthoracic echo (TTE) complete (Cupid Only):   Results for orders placed or performed during the hospital encounter of 08/25/24   Echo   Result Value Ref Range    Left Atrium Major Axis 5.12 cm    Left Atrium Minor Axis 5.35 cm    RA Major Axis 4.15 cm    LV Diastolic Volume 34.23 mL    LV Systolic Volume 12.14 mL    MV Peak A Ciro 1.06 m/s    TR Max Ciro 2.73 m/s    AR Max Ciro 4.36 m/s    MV Peak E Ciro 1.09 m/s    PV mean gradient 1 mmHg    Mr max ciro 5.43 m/s    RVOT peak VTI 13.5 cm    RVOT peak ciro 0.53 m/s    Ao VTI 51.80 cm    Ao peak ciro 2.07 m/s    LVOT peak VTI 45.40 cm    LVOT peak ciro 1.74 m/s    LVOT diameter 2.02 cm    IVRT 88.49 msec    E wave deceleration time 377.65 msec    AV mean gradient 10 mmHg    AV regurgitation pressure 1/2 time [id number] ms    RV- hernandez basal diam 2.4 cm    TAPSE 2.33 cm    LA size 3.55 cm    Ascending aorta 3.42 cm    STJ 3.19 cm    LVIDs 1.96 (A) 2.1 - 4.0 cm    Ao root annulus 3.01 cm    Posterior Wall 1.26 (A) 0.6 - 1.1 cm    IVS 1.41 (A) 0.6 - 1.1 cm    LVIDd 2.97 (A) 3.5 - 6.0 cm    TDI LATERAL 0.12 m/s    Left Ventricular Outflow Tract Mean Gradient 8.71 mmHg    Left Ventricular Outflow Tract Mean Velocity 1.43 cm/s    Left Ventricular End Systolic Volume by Teichholz Method 12.14 mL    Left Ventricular End Diastolic Volume by Teichholz Method 34.23 mL    IVC diameter 1.59 cm    TDI SEPTAL 0.07 m/s    LV LATERAL E/E' RATIO 9.08 m/s    LV SEPTAL E/E' RATIO 15.57 m/s    FS 34 28 - 44 %    LV mass 128.07 g    ZLVIDD  -5.35     ZLVIDS -3.70     Left Ventricle Relative Wall Thickness 0.85 cm    AV valve area 2.81 cm²    AV Velocity Ratio 0.84     AV index (prosthetic) 0.88     E/A ratio 1.03     Mean e' 0.10 m/s    LVOT area 3.2 cm2    LVOT stroke volume 145.42 cm3    AV peak gradient 17 mmHg    E/E' ratio 11.47 m/s    LV Systolic Volume Index 6.6 mL/m2    LV Diastolic Volume Index 18.70 mL/m2    LV Mass Index 70 g/m2    Triscuspid Valve Regurgitation Peak Gradient 30 mmHg    JI by Velocity Ratio 2.69 cm²    BSA 1.89 m2    LA Volume Index 33.6 mL/m2    LA volume 61.58 cm3    LA WIDTH 3.9 cm    RA Width 3.1 cm    and EKG: Reviewed

## 2024-08-26 NOTE — H&P
[facility] - Emergency Dept.  [facility] Medicine  History & Physical    Patient Name: Annel Arteaga  MRN: [id number]  Patient Class: IP- Inpatient  Admission Date: 8/25/2024  Attending Physician: Dejuan Lares MD   Primary Care Provider: Liliya Cuadra MD         Patient information was obtained from patient, past medical records, and ER records.     Subjective:     Principal Problem:Atrial fibrillation with rapid ventricular response    Chief Complaint:   Chief Complaint   Patient presents with    Palpitations     Elevated HR of 140 @ home/Pain under Left Arm. +Chest Heaviness/Afib HX        HPI: Annel Arteaga is a 89 y.o. female with a PMH  has a past medical history of Allergy, Anesthesia, Arthritis, Asthma, Cataract, HEARING LOSS, High cholesterol, Hypertension, Joint pain, Mastodynia of left breast (6/25/2023), Obesity, Osteoporosis, unspecified, and Pneumonia.presented to the Emergency Department for evaluation of constant, moderate palpitations, sob and chest heaviness that radiates under her left arm which onset at approximately 20:00 tonight. The patient states that she returned home from [place] and sat on her couch just moments before her symptoms began. She does have a Hx of A-fib and takes 81 mg Aspirin.  Patient reports she was followed by Dr. Tucker Lovell with [facility].  States that she had a recent left heart catheterization in April/May of this year and was told that she 'had minimal blockages and everything looked good'.  Patient reports she was due to see Dr. Lovell in October of this year.  No mitigating or exacerbating factors reported. Patient denies any actual CP, fever, chills, cough, rhinorrhea, congestion, emesis, and all other sxs at this time. No prior Tx reported. No further complaints or concerns at this time.     ER workup revealed CBC to be unremarkable, CMP revealing BUN/creatinine of 26/1.0 with EGFR 54 and a CBG of 162 mg/dL, BNP negative, initial  ambulatory troponin negative with repeat troponin slightly elevated to 0.036, TSH 4.488 with free T4 0.93, chest x-ray negative for acute cardiopulmonary findings.  Initial EKG revealed undetermined rhythm with a ventricular rate of 133 beats per minute with a QT/QTC of 322/479.  After patient received 20 mg of Cardizem repeat EKG revealed:  Sinus Pipe with a ventricular rate of 57 beats per minute and a QT/QTC of 446/434.  Hospital Medicine consulted to admit patient for AFib with RVR and elevated troponin.  Patient in agreement with treatment plan.  Patient admitted under inpatient status.    PCP: Liliya Cuadra      Past Medical History:   Diagnosis Date    Allergy     Anesthesia     Arthritis     Asthma     Cataract      both eyes CPG    HEARING LOSS     High cholesterol     Hypertension     Joint pain     osteopenia    Mastodynia of left breast 6/25/2023    Obesity     Osteoporosis, unspecified     Pneumonia     did not require hospitalization       Past Surgical History:   Procedure Laterality Date    APPENDECTOMY      CATARACT EXTRACTION Bilateral     CPG    CATARACT EXTRACTION, BILATERAL  2006    HAND SURGERY      HYSTERECTOMY      STAPEDECTOMY      x 2    TONSILLECTOMY         Review of patient's allergies indicates:   Allergen Reactions    Onion Shortness Of Breath    Zoloft [sertraline] Hives, Itching and Rash    Codeine Other (See Comments)     Flushing skin    Other Other (See Comments)     Pseudocholinesterase -- Paralysis    Pineapple Nausea And Vomiting    Prevacid [lansoprazole] Other (See Comments)     dysuria    Penicillins Rash    Vesicare [solifenacin] Hives and Rash       No current facility-administered medications on file prior to encounter.     Current Outpatient Medications on File Prior to Encounter   Medication Sig    amlodipine (NORVASC) 5 MG tablet Take 5 mg by mouth once daily.     ASCORBATE CALCIUM (KELL-C ORAL) Take by mouth.      ascorbic acid, vitamin C, (VITAMIN C) 1000 MG tablet  Take 1,000 mg by mouth.    aspirin (ECOTRIN) 81 MG EC tablet Take 81 mg by mouth once daily.      b complex vitamins tablet Take 1 tablet by mouth once daily.    biotin 10 mg Tab Take 1 tablet by mouth once daily.    coenzyme Q10 200 mg capsule Take 200 mg by mouth.    desonide (DESOWEN) 0.05 % cream Apply topically 2 (two) times daily. Mix 1:1 and use with ketoconazole cream BID prn for rash, use as needed for rash of face    dextromethorphan-guaifenesin  mg (MUCINEX DM)  mg per 12 hr tablet Take 1 tablet by mouth every 12 (twelve) hours as needed.    diphenhydrAMINE (SOMINEX) 25 mg tablet Take 25 mg by mouth nightly as needed.      DOCUSATE CALCIUM (STOOL SOFTENER ORAL) Take by mouth once daily.    ERGOCALCIFEROL, VITAMIN D2, (VITAMIN D ORAL) Take by mouth once daily.    fluticasone (FLONASE) 50 mcg/actuation nasal spray INSTILL 1 SPRAY IN EACH NOSTRIL TWICE DAILY    ketoconazole (NIZORAL) 2 % cream AS DIRECTED TWICE DAILY AS NEEDED    Lactobacillus acidophilus 10 billion cell Cap Take 200 mg by mouth.    methylcellulose (ARTIFICIAL TEARS) 1 % ophthalmic solution 1 drop as needed.      metoprolol tartrate (LOPRESSOR) 25 MG tablet TK 1 T PO D    montelukast (SINGULAIR) 10 mg tablet Take 10 mg by mouth every evening.    multivitamin (THERAGRAN) per tablet Take 1 tablet by mouth once daily.      omeprazole (PRILOSEC) 20 MG capsule TAKE 2 CAPSULES BY MOUTH EVERY DAY    TURMERIC ORAL Take 400 mg by mouth.    vitamin E 400 UNIT capsule Take 400 Units by mouth once daily.       Family History       Problem Relation (Age of Onset)    Cancer Father, Brother    Diabetes Sister, Daughter    Glaucoma Father, Brother    Heart disease Sister    Hypertension Mother, Father, Sister, Brother, Sister, Sister, Sister    Retinal detachment Sister          Tobacco Use    Smoking status: Never    Smokeless tobacco: Never   Substance and Sexual Activity    Alcohol use: No    Drug use: No    Sexual activity: Never     Birth  control/protection: None     Review of Systems   Constitutional:  Negative for chills and diaphoresis.   Respiratory:  Positive for shortness of breath. Negative for cough and wheezing.    Cardiovascular:  Positive for chest pain and palpitations. Negative for leg swelling.   Gastrointestinal:  Negative for abdominal pain, diarrhea, nausea and vomiting.   Neurological:  Negative for dizziness, light-headedness and headaches.   All other systems reviewed and are negative.    Objective:     Vital Signs (Most Recent):  Temp: 98.2 °F (36.8 °C) (08/26/24 0027)  Pulse: (!) 58 (08/26/24 0243)  Resp: 20 (08/26/24 0243)  BP: (!) 126/58 (08/26/24 0233)  SpO2: 97 % (08/26/24 0243) Vital Signs (24h Range):  Temp:  [97.8 °F (36.6 °C)-98.2 °F (36.8 °C)] 98.2 °F (36.8 °C)  Pulse:  [] 58  Resp:  [16-25] 20  SpO2:  [94 %-97 %] 97 %  BP: ()/(51-96) 126/58     Weight: 81.6 kg (180 lb)  Body mass index is 32.92 kg/m².     Physical Exam  Vitals and nursing note reviewed.   Constitutional:       General: She is awake. She is not in acute distress.     Appearance: Normal appearance. She is well-developed and well-groomed. She is not ill-appearing, toxic-appearing or diaphoretic.   HENT:      Head: Normocephalic and atraumatic.   Eyes:      Extraocular Movements: Extraocular movements intact.      Conjunctiva/sclera: Conjunctivae normal.   Cardiovascular:      Rate and Rhythm: Regular rhythm. Bradycardia present.      Heart sounds: Normal heart sounds. No murmur heard.  Pulmonary:      Effort: Pulmonary effort is normal.      Breath sounds: Normal breath sounds. No decreased breath sounds, wheezing, rhonchi or rales.   Abdominal:      General: Bowel sounds are normal.      Palpations: Abdomen is soft.      Tenderness: There is no abdominal tenderness.   Musculoskeletal:      Cervical back: Normal range of motion and neck supple.      Right lower leg: No edema.      Left lower leg: No edema.      Comments: 5/5 strength  throughout   Skin:     General: Skin is warm and dry.      Capillary Refill: Capillary refill takes less than 2 seconds.   Neurological:      General: No focal deficit present.      Mental Status: She is alert and oriented to person, place, and time. Mental status is at baseline.      GCS: GCS eye subscore is 4. GCS verbal subscore is 5. GCS motor subscore is 6.      Cranial Nerves: Cranial nerves 2-12 are intact.      Sensory: Sensation is intact.      Motor: Motor function is intact.   Psychiatric:         Mood and Affect: Mood normal.         Speech: Speech normal.         Behavior: Behavior normal. Behavior is cooperative.              LABS:  Recent Results (from the past 24 hour(s))   CBC auto differential    Collection Time: 08/25/24 10:29 PM   Result Value Ref Range    WBC 9.36 3.90 - 12.70 K/uL    RBC 4.11 4.00 - 5.40 M/uL    Hemoglobin 12.4 12.0 - 16.0 g/dL    Hematocrit 37.2 37.0 - 48.5 %    MCV 91 82 - 98 fL    MCH 30.2 27.0 - 31.0 pg    MCHC 33.3 32.0 - 36.0 g/dL    RDW 13.6 11.5 - 14.5 %    Platelets 252 150 - 450 K/uL    MPV 10.9 9.2 - 12.9 fL    Immature Granulocytes 0.2 0.0 - 0.5 %    Gran # (ANC) 5.2 1.8 - 7.7 K/uL    Immature Grans (Abs) 0.02 0.00 - 0.04 K/uL    Lymph # 3.1 1.0 - 4.8 K/uL    Mono # 0.8 0.3 - 1.0 K/uL    Eos # 0.2 0.0 - 0.5 K/uL    Baso # 0.04 0.00 - 0.20 K/uL    nRBC 0 0 /100 WBC    Gran % 55.6 38.0 - 73.0 %    Lymph % 33.3 18.0 - 48.0 %    Mono % 8.3 4.0 - 15.0 %    Eosinophil % 2.2 0.0 - 8.0 %    Basophil % 0.4 0.0 - 1.9 %    Differential Method Automated    Comprehensive metabolic panel    Collection Time: 08/25/24 10:29 PM   Result Value Ref Range    Sodium 141 136 - 145 mmol/L    Potassium 3.6 3.5 - 5.1 mmol/L    Chloride 109 95 - 110 mmol/L    CO2 17 (L) 23 - 29 mmol/L    Glucose 162 (H) 70 - 110 mg/dL    BUN 26 (H) 8 - 23 mg/dL    Creatinine 1.0 0.5 - 1.4 mg/dL    Calcium 9.1 8.7 - 10.5 mg/dL    Total Protein 7.3 6.0 - 8.4 g/dL    Albumin 3.7 3.5 - 5.2 g/dL    Total  Bilirubin 0.3 0.1 - 1.0 mg/dL    Alkaline Phosphatase 75 55 - 135 U/L    AST 21 10 - 40 U/L    ALT 14 10 - 44 U/L    eGFR 54 (A) >60 mL/min/1.73 m^2    Anion Gap 15 8 - 16 mmol/L   Brain natriuretic peptide    Collection Time: 08/25/24 10:29 PM   Result Value Ref Range    BNP 87 0 - 99 pg/mL   Troponin I    Collection Time: 08/25/24 10:29 PM   Result Value Ref Range    Troponin I <0.006 0.000 - 0.026 ng/mL   TSH    Collection Time: 08/25/24 10:29 PM   Result Value Ref Range    TSH 4.488 (H) 0.400 - 4.000 uIU/mL   T4, Free    Collection Time: 08/25/24 10:29 PM   Result Value Ref Range    Free T4 0.93 0.71 - 1.51 ng/dL   Troponin I    Collection Time: 08/26/24  1:18 AM   Result Value Ref Range    Troponin I 0.036 (H) 0.000 - 0.026 ng/mL       RADIOLOGY  X-Ray Chest AP Portable    Result Date: 8/25/2024  EXAMINATION: XR CHEST AP PORTABLE CLINICAL HISTORY: Chest pain, unspecified TECHNIQUE: Single frontal portable view of the chest was performed. COMPARISON: None FINDINGS: No pulmonary consolidation or pleural effusion     Stable chest exam Electronically signed by: Faye Newell Date:    08/25/2024 Time:    23:03      EKG    MICROBIOLOGY    MDM     Amount and/or Complexity of Data Reviewed  Clinical lab tests: reviewed  Tests in the radiology section of CPT®: reviewed  Tests in the medicine section of CPT®: reviewed  Discussion of test results with the performing providers: yes  Decide to obtain previous medical records or to obtain history from someone other than the patient: yes  Obtain history from someone other than the patient: yes  Review and summarize past medical records: yes  Discuss the patient with other providers: yes  Independent visualization of images, tracings, or specimens: yes        Assessment/Plan:     * Atrial fibrillation with rapid ventricular response  Patient with Paroxysmal (<7 days) atrial fibrillation which is controlled currently with Beta Blocker and IV dose of Cardizem . Patient is  currently in sinus Pipe.[id number] Score: 3. HASBLED Score: . Anticoagulation done with 81 mg aspirin..  Cardiology consulted. TTE. Tele monitoring.  Trend troponin.    Elevated troponin  Likely ischemic demand from episode of AFib with RVR.  Denies any cardiopulmonary complaints at this time.  Plan:   -tele monitoring   -trend troponin   -echo  -analgesics p.r.n.   -nitrites p.r.n.   -cardiology consult      Essential hypertension  Chronic, controlled. Latest blood pressure and vitals reviewed-     Temp:  [97.8 °F (36.6 °C)-98.2 °F (36.8 °C)]   Pulse:  []   Resp:  [16-25]   BP: ()/(51-96)   SpO2:  [94 %-97 %] .   Home meds for hypertension were reviewed and noted below.   Hypertension Medications               amlodipine (NORVASC) 5 MG tablet Take 5 mg by mouth once daily.                  While in the hospital, will manage blood pressure as follows; Continue home antihypertensive regimen    Will utilize p.r.n. blood pressure medication only if patient's blood pressure greater than 180/110 and she develops symptoms such as worsening chest pain or shortness of breath.    Hiatal hernia with gastroesophageal reflux  Chronic. Stable. Currently asymptomatic. Home medications include PPI/Antacids as needed.  Plan:  -Continue PPI/Antacids as needed         Hyperlipidemia  Patient is chronically on statin.will continue for now. Last Lipid Panel:   Lab Results   Component Value Date    CHOL 186 04/08/2024    HDL 56 04/08/2024    LDLCALC 103 (H) 04/08/2024    TRIG 154 (H) 04/08/2024    CHOLHDL 20.9 12/13/2013     Plan:  -low fat/low calorie diet  -continue statin          VTE Risk Mitigation (From admission, onward)           Ordered     enoxaparin injection 40 mg  Daily         08/26/24 0400     IP VTE HIGH RISK PATIENT  Once         08/26/24 0400     Place sequential compression device  Until discontinued         08/26/24 0400                  //Core Measures   -DVT proph: SCDs, lovenox  -Code status Full     -Surrogate:son    Components of this note were documented using a voice recognition system and are subject to errors not corrected at the time the document was proof read. Please contact the author for any clarifications.        Chai Lares NP  Department of Hospital Medicine  [facility] - Emergency Dept.

## 2024-08-26 NOTE — ASSESSMENT & PLAN NOTE
-Troponin < 0.006>0.036>0.074  -Elevation likely secondary to demand ischemia from afib with RVR, repeat pending  -Reports prior LHC by outside cardiologist, Dr. Lovell in April with no significant disease, mentioned in PCP's note  -Continue OMT ast olerated  -TTE pending

## 2024-08-26 NOTE — ED NOTES
MD notified pt's current HR is 50. Pt asymptomatic. NAD noted. Call light within reach. Family remains at bedside. Pt instructed to call staff if symptoms/discomfort develops. Pt v/u. Pt also instructed to call staff for needs.

## 2024-08-26 NOTE — HPI
"Ms. Arteaga is an 89 year old female patient whose current medical conditions include HTN, obesity, and PAF (on ASA only) who presented to [facility] ED overnight due to palpitations that onset shortly PTA. Associated symptoms included SOB, chest heaviness, and left arm discomfort. She denied any associated fever, chills, palpitations, near syncope, or syncope. Initial workup in ED troponin of 0.036. EKG showed afib with RVR with HR in 130's and patient was subsequently admitted for further evaluation and treatment. Cardiology consulted to assist with management. Patient seen and examined today in ED with her son at bedside. Feeling better. Converted to SR after IVP of cardizem. CP free. No other CV complaints. Followed on OP basis by Dr. Lovell. She reports compliance with her medications, on ASA only as OP. States she has had "flare ups of afib before". Denies frequent falls, anemia, or bleeding issues. Labs reviewed. Troponin < 0.006>0.036>0.074, repeat pending. Patient reports prior LHC earlier this year which showed no significant blockages. TTE pending.    Discussed risks/benefits of AC in detail. Patient agreeable to initiation of Eliquis, will use lower dose given advanced age. Can f/u in clinic with her primary cardiologist for Anti-Xa lab as OP.  "

## 2024-08-26 NOTE — CONSULTS
"[facility] - Emergency Dept.  Cardiology  Consult Note    Patient Name: Annel Arteaga  MRN: [id number]  Admission Date: 8/25/2024  Hospital Length of Stay: 0 days  Code Status: Full Code   Attending Provider: Johnny Urbina MD   Consulting Provider: Mayr English PA-C  Primary Care Physician: No primary care provider on file.  Principal Problem:Atrial fibrillation with rapid ventricular response    Patient information was obtained from patient, past medical records, and ER records.     Inpatient consult to Cardiology  Consult performed by: Mary English PA-C  Consult ordered by: Chai Lares NP        Subjective:     Chief Complaint: CP/afib    HPI:   Ms. Arteaga is an 89 year old female patient whose current medical conditions include HTN, obesity, and PAF (on ASA only) who presented to [facility] ED overnight due to palpitations that onset shortly PTA. Associated symptoms included SOB, chest heaviness, and left arm discomfort. She denied any associated fever, chills, palpitations, near syncope, or syncope. Initial workup in ED troponin of 0.036. EKG showed afib with RVR with HR in 130's and patient was subsequently admitted for further evaluation and treatment. Cardiology consulted to assist with management. Patient seen and examined today in ED with her son at bedside. Feeling better. Converted to SR after IVP of cardizem. CP free. No other CV complaints. Followed on OP basis by Dr. Lovell. She reports compliance with her medications, on ASA only as OP. States she has had "flare ups of afib before". Denies frequent falls, anemia, or bleeding issues. Labs reviewed. Troponin < 0.006>0.036>0.074, repeat pending. Patient reports prior LHC earlier this year which showed no significant blockages. TTE pending.    Discussed risks/benefits of AC in detail. Patient agreeable to initiation of Eliquis, will use lower dose given advanced age. Can f/u in clinic with her primary cardiologist for Anti-Xa lab as OP.    Past " Medical History:   Diagnosis Date    Allergy     Anesthesia     Arthritis     Asthma     Atrial fibrillation with rapid ventricular response 8/26/2024    Cataract      both eyes CPG    HEARING LOSS     High cholesterol     Hypertension     Joint pain     osteopenia    Mastodynia of left breast 6/25/2023    Obesity     Osteoporosis, unspecified     Pneumonia     did not require hospitalization       Past Surgical History:   Procedure Laterality Date    APPENDECTOMY      CATARACT EXTRACTION Bilateral     CPG    CATARACT EXTRACTION, BILATERAL  2006    HAND SURGERY      HYSTERECTOMY      STAPEDECTOMY      x 2    TONSILLECTOMY         Review of patient's allergies indicates:   Allergen Reactions    Onion Shortness Of Breath    Zoloft [sertraline] Hives, Itching and Rash    Codeine Other (See Comments)     Flushing skin    Other Other (See Comments)     Pseudocholinesterase -- Paralysis    Pineapple Nausea And Vomiting    Prevacid [lansoprazole] Other (See Comments)     dysuria    Penicillins Rash    Vesicare [solifenacin] Hives and Rash       No current facility-administered medications on file prior to encounter.     Current Outpatient Medications on File Prior to Encounter   Medication Sig    amlodipine (NORVASC) 5 MG tablet Take 5 mg by mouth once daily.     aspirin (ECOTRIN) 81 MG EC tablet Take 81 mg by mouth once daily.      biotin 10 mg Tab Take 1 tablet by mouth once daily.    ERGOCALCIFEROL, VITAMIN D2, (VITAMIN D ORAL) Take by mouth once daily.    fluticasone (FLONASE) 50 mcg/actuation nasal spray INSTILL 1 SPRAY IN EACH NOSTRIL TWICE DAILY    lovastatin (MEVACOR) 10 MG tablet Take 10 mg by mouth every evening.    methylcellulose (ARTIFICIAL TEARS) 1 % ophthalmic solution 1 drop as needed.      metoprolol tartrate (LOPRESSOR) 25 MG tablet TK 1 T PO D    montelukast (SINGULAIR) 10 mg tablet Take 10 mg by mouth every evening.    multivitamin (THERAGRAN) per tablet Take 1 tablet by mouth once daily.       omeprazole (PRILOSEC) 20 MG capsule TAKE 2 CAPSULES BY MOUTH EVERY DAY    TURMERIC ORAL Take 400 mg by mouth.    ASCORBATE CALCIUM (KELL-C ORAL) Take by mouth.      ascorbic acid, vitamin C, (VITAMIN C) 1000 MG tablet Take 1,000 mg by mouth.    b complex vitamins tablet Take 1 tablet by mouth once daily.    coenzyme Q10 200 mg capsule Take 200 mg by mouth.    desonide (DESOWEN) 0.05 % cream Apply topically 2 (two) times daily. Mix 1:1 and use with ketoconazole cream BID prn for rash, use as needed for rash of face    dextromethorphan-guaifenesin  mg (MUCINEX DM)  mg per 12 hr tablet Take 1 tablet by mouth every 12 (twelve) hours as needed.    diphenhydrAMINE (SOMINEX) 25 mg tablet Take 25 mg by mouth nightly as needed.      DOCUSATE CALCIUM (STOOL SOFTENER ORAL) Take by mouth once daily.    ketoconazole (NIZORAL) 2 % cream AS DIRECTED TWICE DAILY AS NEEDED    Lactobacillus acidophilus 10 billion cell Cap Take 200 mg by mouth.    vitamin E 400 UNIT capsule Take 400 Units by mouth once daily.       Family History       Problem Relation (Age of Onset)    Cancer Father, Brother    Diabetes Sister, Daughter    Glaucoma Father, Brother    Heart disease Sister    Hypertension Mother, Father, Sister, Brother, Sister, Sister, Sister    Retinal detachment Sister          Tobacco Use    Smoking status: Never    Smokeless tobacco: Never   Substance and Sexual Activity    Alcohol use: No    Drug use: No    Sexual activity: Never     Birth control/protection: None     Review of Systems   Constitutional: Positive for malaise/fatigue.   HENT: Negative.     Cardiovascular:  Positive for chest pain (resolved) and dyspnea on exertion.   Respiratory:  Positive for shortness of breath.    Endocrine: Negative.    Hematologic/Lymphatic: Negative.    Skin: Negative.    Musculoskeletal:  Positive for arthritis (L arm).   Gastrointestinal: Negative.    Genitourinary: Negative.    Neurological: Negative.     Psychiatric/Behavioral: Negative.     Allergic/Immunologic: Negative.      Objective:     Vital Signs (Most Recent):  Temp: 97.6 °F (36.4 °C) (08/26/24 0720)  Pulse: 70 (08/26/24 0904)  Resp: (!) 22 (08/26/24 0904)  BP: (!) 160/67 (08/26/24 0904)  SpO2: 96 % (08/26/24 0904) Vital Signs (24h Range):  Temp:  [97.6 °F (36.4 °C)-98.2 °F (36.8 °C)] 97.6 °F (36.4 °C)  Pulse:  [] 70  Resp:  [16-25] 22  SpO2:  [94 %-98 %] 96 %  BP: ()/(51-96) 160/67     Weight: 81.6 kg (180 lb)  Body mass index is 32.92 kg/m².    SpO2: 96 %       No intake or output data in the 24 hours ending 08/26/24 1045    Lines/Drains/Airways       Peripheral Intravenous Line  Duration                  Peripheral IV - Single Lumen 08/25/24 2229 20 G Right Hand <1 day                     Physical Exam  Vitals and nursing note reviewed.   Constitutional:       General: She is not in acute distress.     Appearance: Normal appearance. She is well-developed. She is not diaphoretic.   HENT:      Head: Normocephalic and atraumatic.   Eyes:      General:         Right eye: No discharge.         Left eye: No discharge.      Pupils: Pupils are equal, round, and reactive to light.   Cardiovascular:      Rate and Rhythm: Normal rate and regular rhythm.      Heart sounds: Normal heart sounds, S1 normal and S2 normal. No murmur heard.     Comments: Currently in SR  Pulmonary:      Effort: Pulmonary effort is normal. No respiratory distress.      Breath sounds: Normal breath sounds.   Abdominal:      General: There is no distension.   Musculoskeletal:      Right lower leg: No edema.      Left lower leg: No edema.   Skin:     General: Skin is warm and dry.      Findings: No erythema.   Neurological:      Mental Status: She is alert and oriented to person, place, and time.   Psychiatric:         Mood and Affect: Mood normal.         Behavior: Behavior normal.          Significant Labs: CMP   Recent Labs   Lab 08/25/24  2229      K 3.6      CO2  17*   *   BUN 26*   CREATININE 1.0   CALCIUM 9.1   PROT 7.3   ALBUMIN 3.7   BILITOT 0.3   ALKPHOS 75   AST 21   ALT 14   ANIONGAP 15   , CBC   Recent Labs   Lab 08/25/24 2229   WBC 9.36   HGB 12.4   HCT 37.2      , Troponin   Recent Labs   Lab 08/25/24  2229 08/26/24  0118 08/26/24  0423   TROPONINI <0.006 0.036* 0.074*   , and All pertinent lab results from the last 24 hours have been reviewed.    Significant Imaging: Echocardiogram: Transthoracic echo (TTE) complete (Cupid Only):   Results for orders placed or performed during the hospital encounter of 08/25/24   Echo   Result Value Ref Range    Left Atrium Major Axis 5.12 cm    Left Atrium Minor Axis 5.35 cm    RA Major Axis 4.15 cm    LV Diastolic Volume 34.23 mL    LV Systolic Volume 12.14 mL    MV Peak A Ciro 1.06 m/s    TR Max Ciro 2.73 m/s    AR Max Ciro 4.36 m/s    MV Peak E Ciro 1.09 m/s    PV mean gradient 1 mmHg    Mr max ciro 5.43 m/s    RVOT peak VTI 13.5 cm    RVOT peak ciro 0.53 m/s    Ao VTI 51.80 cm    Ao peak ciro 2.07 m/s    LVOT peak VTI 45.40 cm    LVOT peak ciro 1.74 m/s    LVOT diameter 2.02 cm    IVRT 88.49 msec    E wave deceleration time 377.65 msec    AV mean gradient 10 mmHg    AV regurgitation pressure 1/2 time [id number] ms    RV- hernandez basal diam 2.4 cm    TAPSE 2.33 cm    LA size 3.55 cm    Ascending aorta 3.42 cm    STJ 3.19 cm    LVIDs 1.96 (A) 2.1 - 4.0 cm    Ao root annulus 3.01 cm    Posterior Wall 1.26 (A) 0.6 - 1.1 cm    IVS 1.41 (A) 0.6 - 1.1 cm    LVIDd 2.97 (A) 3.5 - 6.0 cm    TDI LATERAL 0.12 m/s    Left Ventricular Outflow Tract Mean Gradient 8.71 mmHg    Left Ventricular Outflow Tract Mean Velocity 1.43 cm/s    Left Ventricular End Systolic Volume by Teichholz Method 12.14 mL    Left Ventricular End Diastolic Volume by Teichholz Method 34.23 mL    IVC diameter 1.59 cm    TDI SEPTAL 0.07 m/s    LV LATERAL E/E' RATIO 9.08 m/s    LV SEPTAL E/E' RATIO 15.57 m/s    FS 34 28 - 44 %    LV mass 128.07 g    ZLVIDD  -5.35     ZLVIDS -3.70     Left Ventricle Relative Wall Thickness 0.85 cm    AV valve area 2.81 cm²    AV Velocity Ratio 0.84     AV index (prosthetic) 0.88     E/A ratio 1.03     Mean e' 0.10 m/s    LVOT area 3.2 cm2    LVOT stroke volume 145.42 cm3    AV peak gradient 17 mmHg    E/E' ratio 11.47 m/s    LV Systolic Volume Index 6.6 mL/m2    LV Diastolic Volume Index 18.70 mL/m2    LV Mass Index 70 g/m2    Triscuspid Valve Regurgitation Peak Gradient 30 mmHg    JI by Velocity Ratio 2.69 cm²    BSA 1.89 m2    LA Volume Index 33.6 mL/m2    LA volume 61.58 cm3    LA WIDTH 3.9 cm    RA Width 3.1 cm    and EKG: Reviewed  Assessment and Plan:   Patient who presents with symptomatic afib. Converted to SR. Elevated troponin likely due to demand ischemia, recent LHC in 4/24 with no significant blockage. Repeat trop and TTE pending. BB and Eliquis initiated.     * Atrial fibrillation with rapid ventricular response  -History of PAF  -Converted to SR post IVP cardizem  -Start Toprol XL 25 mg daily  -Risks/benefits of AC discussed in detail, start Eliquis 2.5 mg BID, anti-Xa lab as OP  -Check TTE    Elevated troponin  -Troponin < 0.006>0.036>0.074  -Elevation likely secondary to demand ischemia from afib with RVR, repeat pending  -Reports prior LHC by outside cardiologist, Dr. Lovell in April with no significant disease, mentioned in PCP's note  -Continue OMT ast olerated  -TTE pending    Essential hypertension  -Continue amlodipine        VTE Risk Mitigation (From admission, onward)           Ordered     enoxaparin injection 40 mg  Daily         08/26/24 0400     IP VTE HIGH RISK PATIENT  Once         08/26/24 0400     Place sequential compression device  Until discontinued         08/26/24 0400                    Thank you for your consult. I will follow-up with patient. Please contact us if you have any additional questions.    Mary English PA-C  Cardiology   [facility] - Emergency Dept.

## 2024-08-26 NOTE — ASSESSMENT & PLAN NOTE
Likely ischemic demand from episode of AFib with RVR.  Denies any cardiopulmonary complaints at this time.  Plan:   -tele monitoring   -trend troponin   -echo  -analgesics p.r.n.   -nitrites p.r.n.   -cardiology consult

## 2024-08-26 NOTE — ASSESSMENT & PLAN NOTE
Chronic, controlled. Latest blood pressure and vitals reviewed-     Temp:  [97.8 °F (36.6 °C)-98.2 °F (36.8 °C)]   Pulse:  []   Resp:  [16-25]   BP: ()/(51-96)   SpO2:  [94 %-97 %] .   Home meds for hypertension were reviewed and noted below.   Hypertension Medications               amlodipine (NORVASC) 5 MG tablet Take 5 mg by mouth once daily.                  While in the hospital, will manage blood pressure as follows; Continue home antihypertensive regimen    Will utilize p.r.n. blood pressure medication only if patient's blood pressure greater than 180/110 and she develops symptoms such as worsening chest pain or shortness of breath.

## 2024-08-26 NOTE — ASSESSMENT & PLAN NOTE
Patient is chronically on statin.will continue for now. Last Lipid Panel:   Lab Results   Component Value Date    CHOL 186 04/08/2024    HDL 56 04/08/2024    LDLCALC 103 (H) 04/08/2024    TRIG 154 (H) 04/08/2024    CHOLHDL 20.9 12/13/2013     Plan:  -low fat/low calorie diet  -continue statin

## 2024-08-27 NOTE — HOSPITAL COURSE
Patient was admitted for AFib RVR.  Cardiology consult on case.  Metoprolol and Eliquis initiated.  AFib was controlled.  Recommended outpatient follow-up with primary cardiologist.  Patient was discharged home.

## 2024-08-27 NOTE — DISCHARGE SUMMARY
[facility]  [facility]  Discharge Summary      Patient Name: Annel Arteaga  MRN: [id number]  WILMAR: [id number]  Patient Class: IP- Inpatient  Admission Date: 8/25/2024  Hospital Length of Stay: 1 days  Discharge Date and Time: 8/26/2024  2:21 PM  Attending Physician: No att. providers found   Discharging Provider: Johnny Urbina MD  Primary Care Provider: No primary care provider on file.    Primary Care Team: Networked reference to record PCT     HPI:   Annel Arteaga is a 89 y.o. female with a PMH  has a past medical history of Allergy, Anesthesia, Arthritis, Asthma, Cataract, HEARING LOSS, High cholesterol, Hypertension, Joint pain, Mastodynia of left breast (6/25/2023), Obesity, Osteoporosis, unspecified, and Pneumonia.presented to the Emergency Department for evaluation of constant, moderate palpitations, sob and chest heaviness that radiates under her left arm which onset at approximately 20:00 tonight. The patient states that she returned home from [place] and sat on her couch just moments before her symptoms began. She does have a Hx of A-fib and takes 81 mg Aspirin.  Patient reports she was followed by Dr. Tucker Lovell with [facility].  States that she had a recent left heart catheterization in April/May of this year and was told that she 'had minimal blockages and everything looked good'.  Patient reports she was due to see Dr. Lovell in October of this year.  No mitigating or exacerbating factors reported. Patient denies any actual CP, fever, chills, cough, rhinorrhea, congestion, emesis, and all other sxs at this time. No prior Tx reported. No further complaints or concerns at this time.     ER workup revealed CBC to be unremarkable, CMP revealing BUN/creatinine of 26/1.0 with EGFR 54 and a CBG of 162 mg/dL, BNP negative, initial troponin negative with repeat troponin slightly elevated to 0.036, TSH 4.488 with free T4 0.93, chest x-ray negative for acute cardiopulmonary findings.   Initial EKG revealed undetermined rhythm with a ventricular rate of 133 beats per minute with a QT/QTC of 322/479.  After patient received 20 mg of Cardizem repeat EKG revealed:  Sinus Pipe with a ventricular rate of 57 beats per minute and a QT/QTC of 446/434.  Hospital Medicine consulted to admit patient for AFib with RVR and elevated troponin.  Patient in agreement with treatment plan.  Patient admitted under inpatient status.    PCP: Liliya Cuadra      * No surgery found *      Hospital Course:   Patient was admitted for AFib RVR.  Cardiology consult on case.  Metoprolol and Eliquis initiated.  AFib was controlled.  Recommended outpatient follow-up with primary cardiologist.  Patient was discharged home.     Goals of Care Treatment Preferences:  Code Status: Full Code      SDOH Screening:  The patient was screened for utility difficulties, food insecurity, transport difficulties, housing insecurity, and interpersonal safety and there were no concerns identified this admission.     Consults:   Consults (From admission, onward)          Status Ordering Provider     Inpatient consult to Cardiology  Once        Provider:  Kenia Llanos MD    Completed MELI BRUCE            No new Assessment & Plan notes have been filed under this hospital service since the last note was generated.  Service: Hospital Medicine    Final Active Diagnoses:    Diagnosis Date Noted POA    PRINCIPAL PROBLEM:  Atrial fibrillation with rapid ventricular response [I48.91] 08/26/2024 Unknown    Elevated troponin [R79.89] 08/26/2024 Unknown    Essential hypertension [I10] 11/02/2015 Yes     Chronic    Hyperlipidemia [E78.5] 11/02/2015 Yes    Hiatal hernia with gastroesophageal reflux [K44.9, K21.9] 10/21/2013 Yes     Chronic      Problems Resolved During this Admission:       Discharged Condition: good    Disposition: Home or Self Care    Follow Up:    Patient Instructions:   No discharge procedures on file.    Significant  Diagnostic Studies: N/A    Pending Diagnostic Studies:       None           Medications:  Reconciled Home Medications:      Medication List        START taking these medications      apixaban 2.5 mg Tab  Commonly known as: ELIQUIS  Take 1 tablet (2.5 mg total) by mouth 2 (two) times daily.     metoprolol succinate 25 MG 24 hr tablet  Commonly known as: TOPROL-XL  Take 1 tablet (25 mg total) by mouth once daily.            CONTINUE taking these medications      ascorbic acid (vitamin C) 1000 MG tablet  Commonly known as: VITAMIN C  Take 1,000 mg by mouth.     aspirin 81 MG EC tablet  Commonly known as: ECOTRIN  Take 81 mg by mouth once daily.     fluticasone propionate 50 mcg/actuation nasal spray  Commonly known as: FLONASE  INSTILL 1 SPRAY IN EACH NOSTRIL TWICE DAILY     lovastatin 10 MG tablet  Commonly known as: MEVACOR  Take 10 mg by mouth every evening.     methylcellulose 1 % ophthalmic solution  Commonly known as: ARTIFICIAL TEARS  1 drop as needed.     montelukast 10 mg tablet  Commonly known as: SINGULAIR  Take 10 mg by mouth every evening.     multivitamin per tablet  Commonly known as: THERAGRAN  Take 1 tablet by mouth once daily.     omeprazole 20 MG capsule  Commonly known as: PRILOSEC  TAKE 2 CAPSULES BY MOUTH EVERY DAY     STOOL SOFTENER ORAL  Take by mouth once daily.     VITAMIN D ORAL  Take by mouth once daily.            STOP taking these medications      b complex vitamins tablet     coenzyme Q10 200 mg capsule     desonide 0.05 % cream  Commonly known as: DESOWEN     KELL-C ORAL     Lactobacillus acidophilus 10 billion cell Cap     metoprolol tartrate 25 MG tablet  Commonly known as: LOPRESSOR     TURMERIC ORAL     vitamin E 400 UNIT capsule            ASK your doctor about these medications      albuterol 90 mcg/actuation inhaler  Commonly known as: PROVENTIL/VENTOLIN HFA  Inhale 2 puffs into the lungs every 4 (four) hours as needed.     amLODIPine 5 MG tablet  Commonly known as: NORVASC  Take  5 mg by mouth once daily.  Ask about: Which instructions should I use?     ketoconazole 2 % cream  Commonly known as: NIZORAL  Apply topically once daily.  Ask about: Which instructions should I use?     vitamin D 1000 units Tab  Commonly known as: VITAMIN D3  Take 1 tablet by mouth every morning.              Indwelling Lines/Drains at time of discharge:   Lines/Drains/Airways       None                   Time spent on the discharge of patient: 36 minutes         Johnny Urbina MD  Department of Hospital Medicine  '[facility] - Emergency Dept.

## 2024-08-29 NOTE — PLAN OF CARE
Through communication with [facility], the Inpatient order is being changed to observation as the payer will not authorize Inpatient and the facility agrees not to appeal or challenge the change in status. The account will be changed to Observation for billing purposes.      Reina Alcala MD  Physician Advisor

## 2024-08-30 ENCOUNTER — PATIENT OUTREACH (OUTPATIENT)
Dept: ADMINISTRATIVE | Facility: CLINIC | Age: 89
End: 2024-08-30
Payer: MEDICARE

## 2025-01-06 ENCOUNTER — OFFICE VISIT (OUTPATIENT)
Dept: OPHTHALMOLOGY | Facility: CLINIC | Age: OVER 89
End: 2025-01-06
Payer: MEDICARE

## 2025-01-06 DIAGNOSIS — H52.4 BILATERAL PRESBYOPIA: ICD-10-CM

## 2025-01-06 DIAGNOSIS — Z96.1 PSEUDOPHAKIA OF BOTH EYES: ICD-10-CM

## 2025-01-06 DIAGNOSIS — R73.03 PRE-DIABETES: Primary | ICD-10-CM

## 2025-01-06 DIAGNOSIS — I10 ESSENTIAL HYPERTENSION: ICD-10-CM

## 2025-01-06 PROCEDURE — 92015 DETERMINE REFRACTIVE STATE: CPT | Mod: S$GLB,,, | Performed by: OPTOMETRIST

## 2025-01-06 PROCEDURE — 92014 COMPRE OPH EXAM EST PT 1/>: CPT | Mod: S$GLB,,, | Performed by: OPTOMETRIST

## 2025-01-06 PROCEDURE — 99999 PR PBB SHADOW E&M-EST. PATIENT-LVL III: CPT | Mod: PBBFAC,,, | Performed by: OPTOMETRIST

## 2025-01-06 PROCEDURE — 2023F DILAT RTA XM W/O RTNOPTHY: CPT | Mod: CPTII,S$GLB,, | Performed by: OPTOMETRIST

## 2025-01-06 PROCEDURE — 1159F MED LIST DOCD IN RCRD: CPT | Mod: CPTII,S$GLB,, | Performed by: OPTOMETRIST

## 2025-01-06 NOTE — PROGRESS NOTES
SUBJECTIVE  Annel Arteaga is 89 y.o. female  Corrected distance visual acuity was 20/20 -1 in the right eye and 20/20 -1 in the left eye. Corrected near visual acuity was J1 in the right eye and J3 in the left eye.   Chief Complaint   Patient presents with    Diabetic Eye Exam    Hypertensive Eye Exam    Eye Exam          HPI    Pre- Diabetic  Patient takes glasses off to watch television.  Last eye exam 10/18/2023 TRF.  Update glasses RX.  Lab Results       Component                Value               Date                       HGBA1C                   5.7 (H)             10/11/2024              Last edited by Luisa Das MA on 1/6/2025  8:16 AM.         Assessment /Plan :  1. Pre-diabetes  No Background Diabetic Retinopathy  Strict BG control, f/u w/ PCP, and annual DFE  Stressed importance of DM control to preserve vision     2. Essential hypertension  No HTN Retinopathy, monitor annually.     3. Pseudophakia of both eyes  Well-centered, stable IOL OU. Monitor annually.     4. Bilateral presbyopia  Dispense Final Rx for glasses.  RTC 1 year  Discussed above and answered questions.